# Patient Record
Sex: MALE | Race: WHITE | NOT HISPANIC OR LATINO | Employment: UNEMPLOYED | URBAN - NONMETROPOLITAN AREA
[De-identification: names, ages, dates, MRNs, and addresses within clinical notes are randomized per-mention and may not be internally consistent; named-entity substitution may affect disease eponyms.]

---

## 2017-01-14 DIAGNOSIS — R29.898 WEAKNESS OF HAND: ICD-10-CM

## 2017-01-14 DIAGNOSIS — M50.223 HERNIATION OF INTERVERTEBRAL DISC AT C6-C7 LEVEL: ICD-10-CM

## 2017-01-16 RX ORDER — GABAPENTIN 800 MG/1
TABLET ORAL
Qty: 90 TABLET | Refills: 2 | Status: SHIPPED | OUTPATIENT
Start: 2017-01-16 | End: 2017-03-14 | Stop reason: SDUPTHER

## 2017-03-14 ENCOUNTER — OFFICE VISIT (OUTPATIENT)
Dept: INTERNAL MEDICINE | Facility: CLINIC | Age: 52
End: 2017-03-14

## 2017-03-14 VITALS
HEART RATE: 99 BPM | RESPIRATION RATE: 12 BRPM | BODY MASS INDEX: 31.61 KG/M2 | WEIGHT: 233.4 LBS | OXYGEN SATURATION: 95 % | SYSTOLIC BLOOD PRESSURE: 114 MMHG | HEIGHT: 72 IN | DIASTOLIC BLOOD PRESSURE: 80 MMHG

## 2017-03-14 DIAGNOSIS — M50.223 HERNIATION OF INTERVERTEBRAL DISC AT C6-C7 LEVEL: ICD-10-CM

## 2017-03-14 DIAGNOSIS — F51.04 PSYCHOPHYSIOLOGICAL INSOMNIA: ICD-10-CM

## 2017-03-14 DIAGNOSIS — R29.898 WEAKNESS OF HAND: ICD-10-CM

## 2017-03-14 DIAGNOSIS — F11.93 OPIATE WITHDRAWAL (HCC): Primary | ICD-10-CM

## 2017-03-14 PROCEDURE — 99214 OFFICE O/P EST MOD 30 MIN: CPT | Performed by: FAMILY MEDICINE

## 2017-03-14 RX ORDER — QUETIAPINE FUMARATE 100 MG/1
TABLET, FILM COATED ORAL
Qty: 60 TABLET | Refills: 2 | Status: SHIPPED | OUTPATIENT
Start: 2017-03-14 | End: 2017-04-18 | Stop reason: SDUPTHER

## 2017-03-14 RX ORDER — GABAPENTIN 800 MG/1
800 TABLET ORAL 3 TIMES DAILY
Qty: 90 TABLET | Refills: 2 | Status: SHIPPED | OUTPATIENT
Start: 2017-03-14 | End: 2017-06-10 | Stop reason: SDUPTHER

## 2017-03-14 RX ORDER — TRAZODONE HYDROCHLORIDE 100 MG/1
100 TABLET ORAL NIGHTLY
Qty: 30 TABLET | Refills: 3 | Status: CANCELLED | OUTPATIENT
Start: 2017-03-14

## 2017-03-14 RX ORDER — DICYCLOMINE HCL 20 MG
20 TABLET ORAL EVERY 6 HOURS PRN
Qty: 90 TABLET | Refills: 0 | Status: SHIPPED | OUTPATIENT
Start: 2017-03-14 | End: 2017-04-18

## 2017-03-14 RX ORDER — CLONIDINE HYDROCHLORIDE 0.2 MG/1
0.2 TABLET ORAL EVERY 6 HOURS PRN
Qty: 90 TABLET | Refills: 0 | Status: SHIPPED | OUTPATIENT
Start: 2017-03-14 | End: 2017-04-18

## 2017-03-14 RX ORDER — HYDROXYZINE PAMOATE 25 MG/1
25 CAPSULE ORAL 3 TIMES DAILY PRN
Qty: 90 CAPSULE | Refills: 0 | Status: SHIPPED | OUTPATIENT
Start: 2017-03-14 | End: 2017-04-10 | Stop reason: SDUPTHER

## 2017-03-14 NOTE — PROGRESS NOTES
Follow up visit, med refill on Seroquel. He is doing well on this.  He would also like to have Trazodone 100 mg. He hasn't had this for quite a while. Wondering if it would interfere with the Seroquel.   Discuss Hep C. He would like to see GI  Has not had labs done yet. Will get these done tomorrow.       SUBJECTIVE: Fercho Medellin is a 51 y.o. male seen for a follow up visit;        Insomnia: sleeping better w/ the seroquel - takes just 1/2 tab, occasionally 1 tab.  Feels grumpy and irritable in the morning, but improves.  He ran out.  He switched back to trazodone because he was out - doesn't think that works quite as well.      Drug abuse: He got kicked out of the suboxone clinic because he had a problem w/ the .  He ran out of suboxone on Friday.  Having a lot of diarrhea, bowel cramps, jittery, irritable.        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  He has Insomnia; Mild cognitive disorder; Triceps reflex reduced; Weakness of hand; Focal muscle atrophy; and Low HDL (under 40) on his pertinent problem list.  He  has a past surgical history that includes Back surgery; Appendectomy; Eye surgery; and Interventional radiology procedure (Bilateral, 8/22/2016).  His family history includes Arthritis in his mother, other, and sister; COPD in his father; Diabetes in his father and maternal grandmother; Hyperlipidemia in his other; Hypertension in his father, other, and sister; No Known Problems in his maternal grandfather, paternal grandfather, paternal grandmother, sister, sister, and sister.  He  reports that he has been  "smoking Cigarettes.  He started smoking about 36 years ago. He has a 30.00 pack-year smoking history. He has never used smokeless tobacco. He reports that he drinks alcohol. He reports that he uses illicit drugs, including Marijuana..    Review of Systems   Constitutional: Positive for chills and fatigue.   Gastrointestinal: Positive for diarrhea.   Musculoskeletal: Positive for myalgias.         OBJECTIVE:  Visit Vitals   • /80   • Pulse 99   • Resp 12   • Ht 72\" (182.9 cm)   • Wt 233 lb 6.4 oz (106 kg)   • SpO2 95%   • BMI 31.65 kg/m2        Physical Exam   Constitutional: He appears well-developed and well-nourished.   Cardiovascular: Normal rate and regular rhythm.    Pulmonary/Chest: Effort normal and breath sounds normal.           ASSESSMENT:  1. Weakness of hand  stable  - gabapentin (NEURONTIN) 800 MG tablet; Take 1 tablet by mouth 3 (Three) Times a Day.  Dispense: 90 tablet; Refill: 2    2. Herniation of intervertebral disc at C6-C7 level  Stable, needs EMG  - gabapentin (NEURONTIN) 800 MG tablet; Take 1 tablet by mouth 3 (Three) Times a Day.  Dispense: 90 tablet; Refill: 2    3. Psychophysiological insomnia  improving  - QUEtiapine (SEROquel) 100 MG tablet; Take 0.5-1 tab as needed for sleep  Dispense: 60 tablet; Refill: 2    4. Opiate withdrawal  worsening  - dicyclomine (BENTYL) 20 MG tablet; Take 1 tablet by mouth Every 6 (Six) Hours As Needed (abdominal spasms).  Dispense: 90 tablet; Refill: 0  - CloNIDine (CATAPRES) 0.2 MG tablet; Take 1 tablet by mouth Every 6 (Six) Hours As Needed (agitation, jittery, spasms).  Dispense: 90 tablet; Refill: 0  - hydrOXYzine (VISTARIL) 25 MG capsule; Take 1 capsule by mouth 3 (Three) Times a Day As Needed for Anxiety (agitation).  Dispense: 90 capsule; Refill: 0                  "

## 2017-03-15 ENCOUNTER — LAB (OUTPATIENT)
Dept: INTERNAL MEDICINE | Facility: CLINIC | Age: 52
End: 2017-03-15

## 2017-03-15 DIAGNOSIS — R39.11 URINARY HESITANCY: ICD-10-CM

## 2017-03-15 DIAGNOSIS — B19.20 HEPATITIS C VIRUS INFECTION, UNSPECIFIED CHRONICITY: ICD-10-CM

## 2017-03-15 DIAGNOSIS — R39.15 URINARY URGENCY: ICD-10-CM

## 2017-03-15 DIAGNOSIS — E78.6 LOW HDL (UNDER 40): ICD-10-CM

## 2017-03-15 DIAGNOSIS — R73.9 HYPERGLYCEMIA: ICD-10-CM

## 2017-03-15 DIAGNOSIS — R73.03 PREDIABETES: ICD-10-CM

## 2017-03-15 PROCEDURE — 36415 COLL VENOUS BLD VENIPUNCTURE: CPT | Performed by: FAMILY MEDICINE

## 2017-03-16 LAB
ALBUMIN SERPL-MCNC: 4.3 G/DL (ref 3.2–4.8)
ALBUMIN/GLOB SERPL: 1.1 G/DL (ref 1.5–2.5)
ALP SERPL-CCNC: 94 U/L (ref 25–100)
ALT SERPL-CCNC: 34 U/L (ref 7–40)
AST SERPL-CCNC: 23 U/L (ref 0–33)
BILIRUB SERPL-MCNC: 0.7 MG/DL (ref 0.3–1.2)
BUN SERPL-MCNC: 13 MG/DL (ref 9–23)
BUN/CREAT SERPL: 14.4 (ref 7–25)
CALCIUM SERPL-MCNC: 10.2 MG/DL (ref 8.7–10.4)
CHLORIDE SERPL-SCNC: 103 MMOL/L (ref 99–109)
CHOLEST SERPL-MCNC: 188 MG/DL (ref 0–200)
CO2 SERPL-SCNC: 35 MMOL/L (ref 20–31)
CREAT SERPL-MCNC: 0.9 MG/DL (ref 0.6–1.3)
GLOBULIN SER CALC-MCNC: 3.8 GM/DL
GLUCOSE SERPL-MCNC: 111 MG/DL (ref 70–100)
HCV RNA SERPL NAA+PROBE-ACNC: 680 IU/ML
HCV RNA SERPL NAA+PROBE-LOG IU: 2.83 LOG10 IU/ML
HDLC SERPL-MCNC: 37 MG/DL (ref 40–60)
LDLC SERPL CALC-MCNC: 127 MG/DL (ref 0–100)
POTASSIUM SERPL-SCNC: 4.9 MMOL/L (ref 3.5–5.5)
PROT SERPL-MCNC: 8.1 G/DL (ref 5.7–8.2)
PSA SERPL-MCNC: 1.2 NG/ML (ref 0–4)
SODIUM SERPL-SCNC: 141 MMOL/L (ref 132–146)
TEST INFORMATION: NORMAL
TRIGL SERPL-MCNC: 121 MG/DL (ref 0–150)
VLDLC SERPL CALC-MCNC: 24.2 MG/DL

## 2017-04-10 DIAGNOSIS — F11.93 OPIATE WITHDRAWAL (HCC): ICD-10-CM

## 2017-04-11 RX ORDER — HYDROXYZINE PAMOATE 25 MG/1
CAPSULE ORAL
Qty: 90 CAPSULE | Refills: 2 | Status: SHIPPED | OUTPATIENT
Start: 2017-04-11 | End: 2017-04-18

## 2017-04-18 ENCOUNTER — OFFICE VISIT (OUTPATIENT)
Dept: INTERNAL MEDICINE | Facility: CLINIC | Age: 52
End: 2017-04-18

## 2017-04-18 VITALS
WEIGHT: 231 LBS | SYSTOLIC BLOOD PRESSURE: 122 MMHG | HEIGHT: 72 IN | DIASTOLIC BLOOD PRESSURE: 80 MMHG | BODY MASS INDEX: 31.29 KG/M2 | HEART RATE: 92 BPM | RESPIRATION RATE: 12 BRPM

## 2017-04-18 DIAGNOSIS — F51.04 PSYCHOPHYSIOLOGICAL INSOMNIA: ICD-10-CM

## 2017-04-18 DIAGNOSIS — R10.9 RIGHT FLANK PAIN: ICD-10-CM

## 2017-04-18 DIAGNOSIS — R73.03 PREDIABETES: Primary | ICD-10-CM

## 2017-04-18 DIAGNOSIS — Z72.0 TOBACCO USE: ICD-10-CM

## 2017-04-18 DIAGNOSIS — R35.1 NOCTURIA: ICD-10-CM

## 2017-04-18 LAB — HBA1C MFR BLD: 5.8 %

## 2017-04-18 PROCEDURE — 83036 HEMOGLOBIN GLYCOSYLATED A1C: CPT | Performed by: FAMILY MEDICINE

## 2017-04-18 PROCEDURE — 81003 URINALYSIS AUTO W/O SCOPE: CPT | Performed by: FAMILY MEDICINE

## 2017-04-18 PROCEDURE — 99213 OFFICE O/P EST LOW 20 MIN: CPT | Performed by: FAMILY MEDICINE

## 2017-04-18 RX ORDER — NICOTINE 21 MG/24HR
1 PATCH, TRANSDERMAL 24 HOURS TRANSDERMAL EVERY 24 HOURS
Qty: 21 PATCH | Refills: 0 | Status: SHIPPED | OUTPATIENT
Start: 2017-04-18 | End: 2017-04-18

## 2017-04-18 RX ORDER — TIZANIDINE 4 MG/1
4 TABLET ORAL NIGHTLY PRN
Qty: 30 TABLET | Refills: 1 | Status: SHIPPED | OUTPATIENT
Start: 2017-04-18 | End: 2017-08-09 | Stop reason: SDUPTHER

## 2017-04-18 RX ORDER — QUETIAPINE FUMARATE 25 MG/1
TABLET, FILM COATED ORAL
Qty: 60 TABLET | Refills: 1 | Status: SHIPPED | OUTPATIENT
Start: 2017-04-18 | End: 2017-08-12 | Stop reason: SDUPTHER

## 2017-04-18 RX ORDER — POLYETHYLENE GLYCOL 3350 17 G
4 POWDER IN PACKET (EA) ORAL AS NEEDED
Qty: 168 LOZENGE | Refills: 3 | Status: SHIPPED | OUTPATIENT
Start: 2017-04-18 | End: 2018-07-13

## 2017-04-18 NOTE — PATIENT INSTRUCTIONS
Steps to Quit Smoking   Smoking tobacco can be harmful to your health and can affect almost every organ in your body. Smoking puts you, and those around you, at risk for developing many serious chronic diseases. Quitting smoking is difficult, but it is one of the best things that you can do for your health. It is never too late to quit.  WHAT ARE THE BENEFITS OF QUITTING SMOKING?  When you quit smoking, you lower your risk of developing serious diseases and conditions, such as:  · Lung cancer or lung disease, such as COPD.  · Heart disease.  · Stroke.  · Heart attack.  · Infertility.  · Osteoporosis and bone fractures.  Additionally, symptoms such as coughing, wheezing, and shortness of breath may get better when you quit. You may also find that you get sick less often because your body is stronger at fighting off colds and infections. If you are pregnant, quitting smoking can help to reduce your chances of having a baby of low birth weight.  HOW DO I GET READY TO QUIT?  When you decide to quit smoking, create a plan to make sure that you are successful. Before you quit:  · Pick a date to quit. Set a date within the next two weeks to give you time to prepare.  · Write down the reasons why you are quitting. Keep this list in places where you will see it often, such as on your bathroom mirror or in your car or wallet.  · Identify the people, places, things, and activities that make you want to smoke (triggers) and avoid them. Make sure to take these actions:    Throw away all cigarettes at home, at work, and in your car.    Throw away smoking accessories, such as ashtrays and lighters.    Clean your car and make sure to empty the ashtray.    Clean your home, including curtains and carpets.  · Tell your family, friends, and coworkers that you are quitting. Support from your loved ones can make quitting easier.  · Talk with your health care provider about your options for quitting smoking.  · Find out what treatment  "options are covered by your health insurance.  WHAT STRATEGIES CAN I USE TO QUIT SMOKING?   Talk with your healthcare provider about different strategies to quit smoking. Some strategies include:  · Quitting smoking altogether instead of gradually lessening how much you smoke over a period of time. Research shows that quitting \"cold turkey\" is more successful than gradually quitting.  · Attending in-person counseling to help you build problem-solving skills. You are more likely to have success in quitting if you attend several counseling sessions. Even short sessions of 10 minutes can be effective.  · Finding resources and support systems that can help you to quit smoking and remain smoke-free after you quit. These resources are most helpful when you use them often. They can include:    Online chats with a counselor.    Telephone quitlines.    Printed self-help materials.    Support groups or group counseling.    Text messaging programs.    Mobile phone applications.  · Taking medicines to help you quit smoking. (If you are pregnant or breastfeeding, talk with your health care provider first.) Some medicines contain nicotine and some do not. Both types of medicines help with cravings, but the medicines that include nicotine help to relieve withdrawal symptoms. Your health care provider may recommend:    Nicotine patches, gum, or lozenges.    Nicotine inhalers or sprays.    Non-nicotine medicine that is taken by mouth.  Talk with your health care provider about combining strategies, such as taking medicines while you are also receiving in-person counseling. Using these two strategies together makes you more likely to succeed in quitting than if you used either strategy on its own.  If you are pregnant or breastfeeding, talk with your health care provider about finding counseling or other support strategies to quit smoking. Do not take medicine to help you quit smoking unless told to do so by your health care " provider.  WHAT THINGS CAN I DO TO MAKE IT EASIER TO QUIT?  Quitting smoking might feel overwhelming at first, but there is a lot that you can do to make it easier. Take these important actions:  · Reach out to your family and friends and ask that they support and encourage you during this time. Call telephone quitlines, reach out to support groups, or work with a counselor for support.  · Ask people who smoke to avoid smoking around you.  · Avoid places that trigger you to smoke, such as bars, parties, or smoke-break areas at work.  · Spend time around people who do not smoke.  · Lessen stress in your life, because stress can be a smoking trigger for some people. To lessen stress, try:    Exercising regularly.    Deep-breathing exercises.    Yoga.    Meditating.    Performing a body scan. This involves closing your eyes, scanning your body from head to toe, and noticing which parts of your body are particularly tense. Purposefully relax the muscles in those areas.  · Download or purchase mobile phone or tablet apps (applications) that can help you stick to your quit plan by providing reminders, tips, and encouragement. There are many free apps, such as QuitGuide from the CDC (Centers for Disease Control and Prevention). You can find other support for quitting smoking (smoking cessation) through smokefree.gov and other websites.  HOW WILL I FEEL WHEN I QUIT SMOKING?  Within the first 24 hours of quitting smoking, you may start to feel some withdrawal symptoms. These symptoms are usually most noticeable 2-3 days after quitting, but they usually do not last beyond 2-3 weeks. Changes or symptoms that you might experience include:  · Mood swings.  · Restlessness, anxiety, or irritation.  · Difficulty concentrating.  · Dizziness.  · Strong cravings for sugary foods in addition to nicotine.  · Mild weight gain.  · Constipation.  · Nausea.  · Coughing or a sore throat.  · Changes in how your medicines work in your  body.  · A depressed mood.  · Difficulty sleeping (insomnia).  After the first 2-3 weeks of quitting, you may start to notice more positive results, such as:  · Improved sense of smell and taste.  · Decreased coughing and sore throat.  · Slower heart rate.  · Lower blood pressure.  · Clearer skin.  · The ability to breathe more easily.  · Fewer sick days.  Quitting smoking is very challenging for most people. Do not get discouraged if you are not successful the first time. Some people need to make many attempts to quit before they achieve long-term success. Do your best to stick to your quit plan, and talk with your health care provider if you have any questions or concerns.     This information is not intended to replace advice given to you by your health care provider. Make sure you discuss any questions you have with your health care provider.     Document Released: 12/12/2002 Document Revised: 05/03/2016 Document Reviewed: 05/03/2016  [employer] Interactive Patient Education ©2016 [employer]

## 2017-04-18 NOTE — PROGRESS NOTES
Follow up visit. C/O right lower back ache x 10 days. No known injury.   C/O depression with Seroquel. Feels groggy until ~ 2 pm every day, takes Seroquel at bedtime.     SUBJECTIVE: Fercho Medellin is a 51 y.o. male seen for a follow up visit;    Right lower back pain: started 10 days ago, almost constant.  No urinary complaints except continuing nocturia and some hesitancy.  No hematuria.  No back injury that he can think of.  Pain doesn't really change depending on position.  No BM changes.      Insomnia: thinks the seroquel is making him groggy, irritable, on 100 mg seroquel.  Denies taking trazodone with it.       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  He has Insomnia; Mild cognitive disorder; Triceps reflex reduced; Weakness of hand; Focal muscle atrophy; and Low HDL (under 40) on his pertinent problem list.  He  has a past surgical history that includes Back surgery; Appendectomy; Eye surgery; and Interventional radiology procedure (Bilateral, 8/22/2016).  His family history includes Arthritis in his mother, other, and sister; COPD in his father; Diabetes in his father and maternal grandmother; Hyperlipidemia in his other; Hypertension in his father, other, and sister; No Known Problems in his maternal grandfather, paternal grandfather, paternal grandmother, sister, sister, and sister.  He  reports that he has been smoking Cigarettes.  He started smoking about 36 years ago. He has a 30.00 pack-year smoking history. He has never used smokeless tobacco. He reports that he drinks alcohol. He reports that he uses illicit drugs,  "including Marijuana..    Review of Systems   Constitutional: Negative for chills and fever.   Respiratory: Negative.    Cardiovascular: Negative.    Genitourinary: Positive for difficulty urinating and flank pain. Negative for decreased urine volume, dysuria, frequency, hematuria and urgency.   Musculoskeletal: Positive for back pain. Negative for myalgias.   Psychiatric/Behavioral: Positive for dysphoric mood and sleep disturbance.         OBJECTIVE:  /80  Pulse 92  Resp 12  Ht 72\" (182.9 cm)  Wt 231 lb (105 kg)  BMI 31.33 kg/m2     Physical Exam   Constitutional: He appears well-developed and well-nourished. No distress.   Abdominal: There is no tenderness. There is CVA tenderness.   Genitourinary: Rectum normal. Prostate is enlarged. Prostate is not tender.   Genitourinary Comments: 40 gm prostate     Office Visit on 04/18/2017   Component Date Value Ref Range Status   • Hemoglobin A1C 04/18/2017 5.8  % Final   • Color 04/18/2017 Yellow  Yellow, Straw, Dark Yellow, [place] Final   • Clarity, UA 04/18/2017 Clear  Clear Final   • Glucose, UA 04/18/2017 Negative  Negative, 1000 mg/dL (3+) mg/dL Final   • Bilirubin 04/18/2017 Negative  Negative Final   • Ketones, UA 04/18/2017 Negative  Negative Final   • Specific Gravity  04/18/2017 1.015  1.005 - 1.030 Final   • Blood, UA 04/18/2017 Negative  Negative Final   • pH, Urine 04/18/2017 6.0  5.0 - 8.0 Final   • Protein, POC 04/18/2017 Negative  Negative mg/dL Final   • Urobilinogen, UA 04/18/2017 Normal  Normal Final   • Leukocytes 04/18/2017 Negative  Negative Final   • Nitrite, UA 04/18/2017 Negative  Negative Final   ]      ASSESSMENT:  1. Prediabetes  stable  - POC Glycosylated Hemoglobin (Hb A1C)    2. Psychophysiological insomnia  Worsening, will decrease seroquel to better tolerated dose  - QUEtiapine (SEROquel) 25 MG tablet; Take 1-2 tabs as needed for sleep  Dispense: 60 tablet; Refill: 1    3. Right flank pain  Kidney stone versus kidney infection " versus musculoskeletal back pain  - POC Urinalysis Dipstick, Automated; Future  - Chlamydia trachomatis, Neisseria gonorrhoeae, PCR w/ confirmation  - tiZANidine (ZANAFLEX) 4 MG tablet; Take 1 tablet by mouth At Night As Needed for Muscle Spasms.  Dispense: 30 tablet; Refill: 1  - Urine Culture  - POC Urinalysis Dipstick, Automated    4. Tobacco use    - nicotine polacrilex (COMMIT) 4 MG lozenge; Dissolve 1 lozenge in the mouth As Needed for Smoking Cessation.  Dispense: 168 lozenge; Refill: 3    5. Nocturia  stable        I, Luna Crystal MD, hereby attest that the medical record entry above accurately reflects signatures/notations that I made in my capacity as Luna Crystal MD when I treated and diagnosed the above patient.  I do hereby attest that his information is true, accurate, and complete to the best of my knowledge and I understand that any falsification, omission, or concealment of material fact may subject me to administrative, civil, or criminal liability.

## 2017-04-19 LAB
BILIRUB BLD-MCNC: NEGATIVE MG/DL
CLARITY, POC: CLEAR
COLOR UR: YELLOW
GLUCOSE UR STRIP-MCNC: NEGATIVE MG/DL
KETONES UR QL: NEGATIVE
LEUKOCYTE EST, POC: NEGATIVE
NITRITE UR-MCNC: NEGATIVE MG/ML
PH UR: 6 [PH] (ref 5–8)
PROT UR STRIP-MCNC: NEGATIVE MG/DL
RBC # UR STRIP: NEGATIVE /UL
SP GR UR: 1.01 (ref 1–1.03)
UROBILINOGEN UR QL: NORMAL

## 2017-04-25 DIAGNOSIS — F11.93 OPIATE WITHDRAWAL (HCC): ICD-10-CM

## 2017-04-25 RX ORDER — DICYCLOMINE HCL 20 MG
TABLET ORAL
Qty: 90 TABLET | Refills: 2 | Status: SHIPPED | OUTPATIENT
Start: 2017-04-25 | End: 2017-08-24 | Stop reason: SDUPTHER

## 2017-04-25 RX ORDER — CLONIDINE HYDROCHLORIDE 0.2 MG/1
TABLET ORAL
Qty: 90 TABLET | Refills: 2 | Status: SHIPPED | OUTPATIENT
Start: 2017-04-25 | End: 2017-08-24 | Stop reason: SDUPTHER

## 2017-06-10 DIAGNOSIS — M50.223 HERNIATION OF INTERVERTEBRAL DISC AT C6-C7 LEVEL: ICD-10-CM

## 2017-06-10 DIAGNOSIS — R29.898 WEAKNESS OF HAND: ICD-10-CM

## 2017-06-12 RX ORDER — GABAPENTIN 800 MG/1
TABLET ORAL
Qty: 90 TABLET | Refills: 0 | Status: SHIPPED | OUTPATIENT
Start: 2017-06-12 | End: 2017-07-11 | Stop reason: SDUPTHER

## 2017-07-11 DIAGNOSIS — M50.223 HERNIATION OF INTERVERTEBRAL DISC AT C6-C7 LEVEL: ICD-10-CM

## 2017-07-11 DIAGNOSIS — R29.898 WEAKNESS OF HAND: ICD-10-CM

## 2017-07-11 RX ORDER — GABAPENTIN 800 MG/1
800 TABLET ORAL 3 TIMES DAILY
Qty: 90 TABLET | Refills: 0 | OUTPATIENT
Start: 2017-07-11 | End: 2017-08-09 | Stop reason: SDUPTHER

## 2017-07-26 ENCOUNTER — TELEPHONE (OUTPATIENT)
Dept: INTERNAL MEDICINE | Facility: CLINIC | Age: 52
End: 2017-07-26

## 2017-07-26 NOTE — TELEPHONE ENCOUNTER
Patient calling to schedule an appt. He said he got a letter saying he No Showed 3 times and he wanted to reschedule. I asked him if the letter said he was going to be discharged and he said he didn't know.    He said he had memory loss and that was part of his problem.    I wanted to send a message back to see if Dr. Crystal was planning on discharging him or if I should go ahead and schedule him.     I can give him a call back after Dr. Crystal decides.

## 2017-07-27 NOTE — TELEPHONE ENCOUNTER
"Quote from letter sent from epic system:   \"We are sorry that you missed your appointment with Dr. Crystal on 7/18/2017. This is your 3rd no show appointment since October. Our office policy states after three no shows you can be discharged from the practice. Your health and follow-up medical care are important to us. Please call our office as soon as possible so that we may reschedule your appointment. If you have already rescheduled your appointment, please disregard this letter.\"    The letter was a warning that in most situations we would have to discharge him, if it happens again I will have to.  He can reschedule his appointment.   "

## 2017-07-29 DIAGNOSIS — F11.93 OPIATE WITHDRAWAL (HCC): ICD-10-CM

## 2017-08-03 RX ORDER — CLONIDINE HYDROCHLORIDE 0.2 MG/1
TABLET ORAL
Qty: 90 TABLET | Refills: 2 | OUTPATIENT
Start: 2017-08-03

## 2017-08-03 NOTE — TELEPHONE ENCOUNTER
Called [facility] pharmacy to see if pt requested or if it was automatic refill sent to us. Peter at [facility] couldn't tell how it came through on their system, he put in denial and d/c for med, will send pt text letting him know and to follow up with us.

## 2017-08-09 ENCOUNTER — TELEPHONE (OUTPATIENT)
Dept: INTERNAL MEDICINE | Facility: CLINIC | Age: 52
End: 2017-08-09

## 2017-08-09 DIAGNOSIS — R10.9 RIGHT FLANK PAIN: ICD-10-CM

## 2017-08-09 DIAGNOSIS — M50.223 HERNIATION OF INTERVERTEBRAL DISC AT C6-C7 LEVEL: ICD-10-CM

## 2017-08-09 DIAGNOSIS — R29.898 WEAKNESS OF HAND: ICD-10-CM

## 2017-08-09 RX ORDER — TIZANIDINE 4 MG/1
4 TABLET ORAL NIGHTLY PRN
Qty: 30 TABLET | Refills: 1 | Status: SHIPPED | OUTPATIENT
Start: 2017-08-09 | End: 2017-09-18 | Stop reason: SDUPTHER

## 2017-08-10 RX ORDER — GABAPENTIN 800 MG/1
800 TABLET ORAL 3 TIMES DAILY
Qty: 21 TABLET | Refills: 0 | OUTPATIENT
Start: 2017-08-10 | End: 2017-08-10 | Stop reason: SDUPTHER

## 2017-08-10 RX ORDER — GABAPENTIN 800 MG/1
800 TABLET ORAL 3 TIMES DAILY
Qty: 21 TABLET | Refills: 0 | OUTPATIENT
Start: 2017-08-10 | End: 2017-08-18

## 2017-08-12 DIAGNOSIS — F51.04 PSYCHOPHYSIOLOGICAL INSOMNIA: ICD-10-CM

## 2017-08-14 RX ORDER — QUETIAPINE FUMARATE 25 MG/1
TABLET, FILM COATED ORAL
Qty: 60 TABLET | Refills: 1 | Status: SHIPPED | OUTPATIENT
Start: 2017-08-14 | End: 2018-07-13

## 2017-08-17 ENCOUNTER — TELEPHONE (OUTPATIENT)
Dept: INTERNAL MEDICINE | Facility: CLINIC | Age: 52
End: 2017-08-17

## 2017-08-17 DIAGNOSIS — M50.223 HERNIATION OF INTERVERTEBRAL DISC AT C6-C7 LEVEL: ICD-10-CM

## 2017-08-17 DIAGNOSIS — R29.898 WEAKNESS OF HAND: ICD-10-CM

## 2017-08-17 NOTE — TELEPHONE ENCOUNTER
Patient's wife called stating patient left appt today after waiting for an hour and 30 minutes. States that his stress and anxiety was getting to him while waiting. He needs to reschedule, but also needs his gabapentin refilled. Can this be called in for him?

## 2017-08-18 RX ORDER — GABAPENTIN 800 MG/1
TABLET ORAL
Qty: 90 TABLET | Refills: 0 | OUTPATIENT
Start: 2017-08-18 | End: 2017-08-18 | Stop reason: SDUPTHER

## 2017-08-18 RX ORDER — GABAPENTIN 800 MG/1
800 TABLET ORAL 3 TIMES DAILY
Qty: 90 TABLET | Refills: 1 | OUTPATIENT
Start: 2017-08-18 | End: 2018-07-13

## 2017-08-24 DIAGNOSIS — F11.93 OPIATE WITHDRAWAL (HCC): ICD-10-CM

## 2017-08-24 RX ORDER — CLONIDINE HYDROCHLORIDE 0.2 MG/1
0.2 TABLET ORAL
Qty: 90 TABLET | Refills: 2 | Status: SHIPPED | OUTPATIENT
Start: 2017-08-24 | End: 2017-09-19

## 2017-08-24 RX ORDER — DICYCLOMINE HCL 20 MG
20 TABLET ORAL EVERY 6 HOURS
Qty: 90 TABLET | Refills: 1 | Status: SHIPPED | OUTPATIENT
Start: 2017-08-24 | End: 2017-09-19

## 2017-09-18 ENCOUNTER — TELEPHONE (OUTPATIENT)
Dept: INTERNAL MEDICINE | Facility: CLINIC | Age: 52
End: 2017-09-18

## 2017-09-18 DIAGNOSIS — R10.9 RIGHT FLANK PAIN: ICD-10-CM

## 2017-09-18 DIAGNOSIS — R29.898 WEAKNESS OF HAND: ICD-10-CM

## 2017-09-18 DIAGNOSIS — M50.223 HERNIATION OF INTERVERTEBRAL DISC AT C6-C7 LEVEL: ICD-10-CM

## 2017-09-19 RX ORDER — GABAPENTIN 800 MG/1
TABLET ORAL
Qty: 90 TABLET | OUTPATIENT
Start: 2017-09-19

## 2017-09-19 RX ORDER — TIZANIDINE 4 MG/1
TABLET ORAL
Qty: 30 TABLET | Refills: 1 | Status: SHIPPED | OUTPATIENT
Start: 2017-09-19 | End: 2018-07-13

## 2017-09-19 NOTE — TELEPHONE ENCOUNTER
Pt is not supposed to be on clonidine or bentyl.  Those medications were one-time prescriptions for opiate withdrawal - they were refilled automatically on 8/24.     On 8/18 his gabapentin was supposed to have been phoned in for 90 days with 1 refill.      tizanadine was refilled today.

## 2017-09-21 NOTE — TELEPHONE ENCOUNTER
Spoke with [facility] With their computer system, it automatically sends a refill out when the last refill was filled. So we got a refill request on Gabapentin when pt got his last refill on it. ADvised pharm we will not refill at this time. Will do refill when it is due. They also cancelled clonidine and Bentyl.

## 2018-05-07 ENCOUNTER — HOSPITAL ENCOUNTER (EMERGENCY)
Facility: HOSPITAL | Age: 53
Discharge: HOME | End: 2018-05-07
Attending: STUDENT IN AN ORGANIZED HEALTH CARE EDUCATION/TRAINING PROGRAM | Admitting: STUDENT IN AN ORGANIZED HEALTH CARE EDUCATION/TRAINING PROGRAM

## 2018-05-07 VITALS
HEIGHT: 72 IN | RESPIRATION RATE: 20 BRPM | BODY MASS INDEX: 31.83 KG/M2 | SYSTOLIC BLOOD PRESSURE: 160 MMHG | TEMPERATURE: 100.5 F | OXYGEN SATURATION: 96 % | HEART RATE: 112 BPM | WEIGHT: 235 LBS | DIASTOLIC BLOOD PRESSURE: 67 MMHG

## 2018-05-07 DIAGNOSIS — M54.41 RIGHT-SIDED LOW BACK PAIN WITH RIGHT-SIDED SCIATICA, UNSPECIFIED CHRONICITY: Primary | ICD-10-CM

## 2018-05-07 PROCEDURE — 99283 EMERGENCY DEPT VISIT LOW MDM: CPT

## 2018-05-07 PROCEDURE — 96372 THER/PROPH/DIAG INJ SC/IM: CPT

## 2018-05-07 PROCEDURE — 25010000002 HALOPERIDOL LACTATE PER 5 MG: Performed by: STUDENT IN AN ORGANIZED HEALTH CARE EDUCATION/TRAINING PROGRAM

## 2018-05-07 PROCEDURE — 25010000002 ORPHENADRINE CITRATE PER 60 MG: Performed by: STUDENT IN AN ORGANIZED HEALTH CARE EDUCATION/TRAINING PROGRAM

## 2018-05-07 RX ORDER — ORPHENADRINE CITRATE 30 MG/ML
60 INJECTION INTRAMUSCULAR; INTRAVENOUS ONCE
Status: COMPLETED | OUTPATIENT
Start: 2018-05-07 | End: 2018-05-07

## 2018-05-07 RX ORDER — HALOPERIDOL 5 MG/ML
2.5 INJECTION INTRAMUSCULAR ONCE
Status: COMPLETED | OUTPATIENT
Start: 2018-05-07 | End: 2018-05-07

## 2018-05-07 RX ADMIN — ORPHENADRINE CITRATE 60 MG: 30 INJECTION INTRAMUSCULAR; INTRAVENOUS at 03:58

## 2018-05-07 RX ADMIN — HALOPERIDOL LACTATE 2.5 MG: 5 INJECTION, SOLUTION INTRAMUSCULAR at 03:59

## 2018-05-07 NOTE — ED PROVIDER NOTES
Subjective   Patient's 52-year-old male presents via EMS stating that he went to [facility] this evening and they didn't do anything for him.  Patient does have a history of chronic back pain as well as a history of narcotic drug abuse.  He states all they would give him was Toradol.  States his primary care physician used to give him Percocet but now she does not believe in pain pills.  States he is trying to get into pain management but has been unsuccessful so far.  Patient states he has severe constant aching pain radiates down his right buttock and into his right leg.            Review of Systems   All other systems reviewed and are negative.      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    • Migraine headache    • Pectoralis muscle rupture    • Tinnitus    • Triceps reflex reduced    • Unsp injury of musc/fasc/tend triceps, right arm, init        Allergies   Allergen Reactions   • Eye Drops [Naphazoline-Polyethyl Glycol]        Past Surgical History:   Procedure Laterality Date   • APPENDECTOMY     • BACK SURGERY      Tail bone   • EYE SURGERY      cornea transplant; insertion of ocular implant   • INTERVENTIONAL RADIOLOGY PROCEDURE Bilateral 8/22/2016    Procedure:  myelogram cervical with fluoroscopy to be preformed by  in the Cath lab - w/ a f/u CT;  Surgeon: Bryan Miller MD;  Location: [facility] INVASIVE LOCATION;  Service:        Family History   Problem Relation Age of Onset   • Arthritis Mother    • COPD Father    • Hypertension Father    • Diabetes Father    • Arthritis Other    • Hyperlipidemia Other    • Hypertension Other    • Arthritis Sister    • Hypertension Sister    • Diabetes Maternal Grandmother    • No Known Problems Maternal Grandfather    • No Known Problems Paternal Grandmother    • No Known  Problems Paternal Grandfather    • No Known Problems Sister    • No Known Problems Sister    • No Known Problems Sister        Social History     Social History   • Marital status:      Social History Main Topics   • Smoking status: Current Every Day Smoker     Packs/day: 1.00     Years: 30.00     Types: Cigarettes     Start date: 1981   • Smokeless tobacco: Never Used   • Alcohol use Yes      Comment: rare, maybe 1 every few months   • Drug use:      Types: Marijuana   • Sexual activity: Yes     Partners: Female     Other Topics Concern   • Not on file           Objective   Physical Exam   Nursing note and vitals reviewed.    GEN:  is standing up with his right hand on his lower back rubbing his lower back and buttocks.  Head: Normocephalic, atraumatic  Eyes: Pupils equal round reactive to light  ENT: Posterior pharynx normal in appearance, oral mucosa is moist  Chest: Nontender to palpation  Cardiovascular: Regular rate  Lungs: Clear to auscultation bilaterally  Abdomen: Soft, nontender, nondistended, no peritoneal signs  Back: Patient wails uncontrollably with even the slightest touch to his right lower lumbosacral region  Extremities: No edema, normal appearance  Neuro: GCS 15 strength 5 out of 5 in bilateral lower extremities  Psych: Mood and affect are appropriate    Procedures           ED Course  ED Course                  MDM  Number of Diagnoses or Management Options  Right-sided low back pain with right-sided sciatica, unspecified chronicity:   Diagnosis management comments: Differential diagnosis would include musculoskeletal strain, lumbar disc disease, vertebral fracture, cauda equina, or other concerns.    Highly doubt cauda equina at this time given that the patient has no urinary retention, focal neurologic deficit, saddle anesthesia, or loss of control of bowel or bladder.  Did have a discussion about current laws regarding narcotic prescriptions and narcotics use in the emergency  departments in [place].  Did explain to him that we would not be allowed to treat his chronic pain with narcotics in the emergency department.  I did agree to treat him with muscle relaxers as well as something for his nausea because his pain was so severe.  Patient still persisted to request something more despite persistent decline from me regarding this.       Amount and/or Complexity of Data Reviewed  Decide to obtain previous medical records or to obtain history from someone other than the patient: yes  Obtain history from someone other than the patient: yes  Review and summarize past medical records: yes          Final diagnoses:   Right-sided low back pain with right-sided sciatica, unspecified chronicity            Sukumar Daniels MD  05/07/18 0430

## 2018-05-10 ENCOUNTER — HOSPITAL ENCOUNTER (EMERGENCY)
Facility: HOSPITAL | Age: 53
Discharge: HOME | End: 2018-05-10
Attending: EMERGENCY MEDICINE | Admitting: EMERGENCY MEDICINE

## 2018-05-10 VITALS
TEMPERATURE: 99.6 F | RESPIRATION RATE: 20 BRPM | SYSTOLIC BLOOD PRESSURE: 110 MMHG | WEIGHT: 235 LBS | HEART RATE: 100 BPM | BODY MASS INDEX: 31.83 KG/M2 | DIASTOLIC BLOOD PRESSURE: 72 MMHG | HEIGHT: 72 IN | OXYGEN SATURATION: 98 %

## 2018-05-10 DIAGNOSIS — M54.31 SCIATICA OF RIGHT SIDE: ICD-10-CM

## 2018-05-10 DIAGNOSIS — M62.838 MUSCLE SPASM: Primary | ICD-10-CM

## 2018-05-10 PROCEDURE — 25010000002 KETOROLAC TROMETHAMINE PER 15 MG: Performed by: EMERGENCY MEDICINE

## 2018-05-10 PROCEDURE — 96372 THER/PROPH/DIAG INJ SC/IM: CPT

## 2018-05-10 PROCEDURE — 99283 EMERGENCY DEPT VISIT LOW MDM: CPT

## 2018-05-10 PROCEDURE — 25010000002 ORPHENADRINE CITRATE PER 60 MG: Performed by: EMERGENCY MEDICINE

## 2018-05-10 RX ORDER — ORPHENADRINE CITRATE 30 MG/ML
60 INJECTION INTRAMUSCULAR; INTRAVENOUS ONCE
Status: COMPLETED | OUTPATIENT
Start: 2018-05-10 | End: 2018-05-10

## 2018-05-10 RX ORDER — KETOROLAC TROMETHAMINE 30 MG/ML
30 INJECTION, SOLUTION INTRAMUSCULAR; INTRAVENOUS ONCE
Status: COMPLETED | OUTPATIENT
Start: 2018-05-10 | End: 2018-05-10

## 2018-05-10 RX ORDER — CARISOPRODOL 350 MG/1
350 TABLET ORAL 4 TIMES DAILY PRN
Qty: 15 TABLET | Refills: 0 | Status: SHIPPED | OUTPATIENT
Start: 2018-05-10 | End: 2018-07-13

## 2018-05-10 RX ADMIN — ORPHENADRINE CITRATE 60 MG: 30 INJECTION INTRAMUSCULAR; INTRAVENOUS at 11:27

## 2018-05-10 RX ADMIN — KETOROLAC TROMETHAMINE 30 MG: 30 INJECTION, SOLUTION INTRAMUSCULAR; INTRAVENOUS at 11:27

## 2018-05-10 NOTE — ED PROVIDER NOTES
Subjective   52-year-old male presents emergency Department with complaints of lower lumbar pain over the last 4 days.  Patient states that he was seen at [facility] as well as here a few days ago for the same complaint.  Patient states he was told he had a muscle spasm and sciatica.  Patient has percocet that he was prescribed in the past that he has been using for his pain.  It's not helping. He says his PCP will not prescribe narcotic pain medication for his chronic back pain.  He's currently trying to get into pain management. Patient says initially his pain was on the R paralumbar area with radiation into the R buttock. He's having the same pain today. Denies numbness, tingling, weakness, bowel or bladder incontinence, urinary retention, saddle anesthesia. Able to ambulate.           Back Pain   Location:  Lumbar spine  Quality:  Aching  Radiates to: L buttock.  Pain severity:  Moderate  Pain is:  Same all the time  Onset quality:  Gradual  Timing:  Constant  Chronicity:  Recurrent  Context: not falling, not recent illness, not recent injury and not twisting    Relieved by:  Nothing  Worsened by:  Nothing  Ineffective treatments:  Narcotics  Associated symptoms: no abdominal pain, no bladder incontinence, no bowel incontinence, no chest pain, no dysuria, no fever, no leg pain, no numbness, no paresthesias, no pelvic pain, no perianal numbness, no tingling and no weakness        Review of Systems   Constitutional: Negative for chills and fever.   HENT: Negative for congestion, rhinorrhea, sore throat and trouble swallowing.    Eyes: Negative for discharge and visual disturbance.   Respiratory: Negative for cough, chest tightness, shortness of breath and wheezing.    Cardiovascular: Negative for chest pain, palpitations and leg swelling.   Gastrointestinal: Negative for abdominal pain, bowel incontinence, constipation, diarrhea, nausea and vomiting.   Genitourinary: Negative for bladder incontinence,  dysuria, flank pain, hematuria and pelvic pain.   Musculoskeletal: Positive for back pain. Negative for myalgias and neck pain.   Skin: Negative for color change and rash.   Neurological: Negative for dizziness, tingling, weakness, numbness and paresthesias.   Psychiatric/Behavioral: Negative for self-injury and suicidal ideas.       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    • Migraine headache    • Pectoralis muscle rupture    • Tinnitus    • Triceps reflex reduced    • Unsp injury of musc/fasc/tend triceps, right arm, init        Allergies   Allergen Reactions   • Eye Drops [Naphazoline-Polyethyl Glycol] Rash       Past Surgical History:   Procedure Laterality Date   • APPENDECTOMY     • BACK SURGERY      Tail bone   • EYE SURGERY      cornea transplant; insertion of ocular implant   • INTERVENTIONAL RADIOLOGY PROCEDURE Bilateral 8/22/2016    Procedure:  myelogram cervical with fluoroscopy to be preformed by  in the Cath lab - w/ a f/u CT;  Surgeon: Bryan Miller MD;  Location: [facility] INVASIVE LOCATION;  Service:        Family History   Problem Relation Age of Onset   • Arthritis Mother    • COPD Father    • Hypertension Father    • Diabetes Father    • Arthritis Other    • Hyperlipidemia Other    • Hypertension Other    • Arthritis Sister    • Hypertension Sister    • Diabetes Maternal Grandmother    • No Known Problems Maternal Grandfather    • No Known Problems Paternal Grandmother    • No Known Problems Paternal Grandfather    • No Known Problems Sister    • No Known Problems Sister    • No Known Problems Sister        Social History     Social History   • Marital status:      Social History Main Topics   • Smoking status: Current Every Day Smoker     Packs/day: 1.00     Years: 30.00     Types: Cigarettes      Start date: 1981   • Smokeless tobacco: Never Used   • Alcohol use Yes      Comment: rare, maybe 1 every few months   • Drug use:      Types: Marijuana   • Sexual activity: Yes     Partners: Female     Other Topics Concern   • Not on file           Objective   Physical Exam   Constitutional: He is oriented to person, place, and time. He appears well-developed and well-nourished.   HENT:   Head: Normocephalic and atraumatic.   Nose: Nose normal.   Mouth/Throat: Oropharynx is clear and moist.   Eyes: Conjunctivae and EOM are normal. Pupils are equal, round, and reactive to light.   Neck: Normal range of motion. Neck supple.   Cardiovascular: Normal rate, regular rhythm, normal heart sounds and intact distal pulses.    Pulmonary/Chest: Effort normal and breath sounds normal. No respiratory distress. He has no wheezes. He exhibits no tenderness.   Abdominal: Soft. Bowel sounds are normal. There is no tenderness. There is no rebound and no guarding.   Musculoskeletal: Normal range of motion. He exhibits tenderness. He exhibits no edema or deformity.   R paralumbar tenderness to palpation. No CVA tenderness. No midline lumbar or thoracic tenderness.    Neurological: He is alert and oriented to person, place, and time. No cranial nerve deficit. Coordination normal.   Skin: Skin is warm and dry. No rash noted. No erythema. No pallor.   Psychiatric: He has a normal mood and affect. His behavior is normal. Judgment and thought content normal.   Nursing note and vitals reviewed.      Procedures           ED Course  ED Course                  MDM      Final diagnoses:   Muscle spasm   Sciatica of right side            Rico Gallardo MD  05/10/18 0917

## 2018-05-25 ENCOUNTER — TRANSCRIBE ORDERS (OUTPATIENT)
Dept: ADMINISTRATIVE | Facility: HOSPITAL | Age: 53
End: 2018-05-25

## 2018-05-25 DIAGNOSIS — G89.29 CHRONIC BILATERAL LOW BACK PAIN WITH BILATERAL SCIATICA: Primary | ICD-10-CM

## 2018-05-25 DIAGNOSIS — M54.41 CHRONIC BILATERAL LOW BACK PAIN WITH BILATERAL SCIATICA: Primary | ICD-10-CM

## 2018-05-25 DIAGNOSIS — M54.42 CHRONIC BILATERAL LOW BACK PAIN WITH BILATERAL SCIATICA: Primary | ICD-10-CM

## 2018-06-05 ENCOUNTER — HOSPITAL ENCOUNTER (OUTPATIENT)
Dept: MRI IMAGING | Facility: HOSPITAL | Age: 53
End: 2018-06-05

## 2018-08-21 ENCOUNTER — HOSPITAL ENCOUNTER (EMERGENCY)
Facility: HOSPITAL | Age: 53
Discharge: HOME | End: 2018-08-21
Attending: EMERGENCY MEDICINE | Admitting: EMERGENCY MEDICINE

## 2018-08-21 ENCOUNTER — APPOINTMENT (OUTPATIENT)
Dept: GENERAL RADIOLOGY | Facility: HOSPITAL | Age: 53
End: 2018-08-21
Attending: EMERGENCY MEDICINE

## 2018-08-21 VITALS
SYSTOLIC BLOOD PRESSURE: 108 MMHG | HEIGHT: 72 IN | BODY MASS INDEX: 31.29 KG/M2 | RESPIRATION RATE: 18 BRPM | HEART RATE: 92 BPM | OXYGEN SATURATION: 97 % | TEMPERATURE: 97.9 F | WEIGHT: 231 LBS | DIASTOLIC BLOOD PRESSURE: 73 MMHG

## 2018-08-21 DIAGNOSIS — S62.639A: Primary | ICD-10-CM

## 2018-08-21 PROCEDURE — 73130 X-RAY EXAM OF HAND: CPT

## 2018-08-21 PROCEDURE — 99283 EMERGENCY DEPT VISIT LOW MDM: CPT

## 2018-08-21 NOTE — ED PROVIDER NOTES
Subjective   52-year-old male presents to the ED with chief complaint of right hand injury.  Patient states that the injury initially occurred 3 days ago.  Patient states that he was sleeping and he rolled over and smacked his hand onto another object. He notes that it was not initially swollen. It has since progressed and became more swollen. It has also became more painful.  complains of some redness. Pain worse with movement. No IVDA. No fever or chills. No other complaints.             Review of Systems   Constitutional: Negative for fatigue and fever.   Respiratory: Negative for chest tightness, shortness of breath and wheezing.    Cardiovascular: Negative for chest pain, palpitations and leg swelling.   Gastrointestinal: Negative for abdominal pain, diarrhea, nausea and vomiting.   Musculoskeletal: Positive for arthralgias and joint swelling. Negative for back pain and myalgias.        Right Hand pain.   Skin: Negative for color change and rash.   Neurological: Negative for syncope, weakness and light-headedness.       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 (CMS/HCC)    • Migraine headache    • Pectoralis muscle rupture    • Tinnitus    • Triceps reflex reduced    • Unsp injury of musc/fasc/tend triceps, right arm, init        Allergies   Allergen Reactions   • Eye Drops [Naphazoline-Polyethyl Glycol] Rash       Past Surgical History:   Procedure Laterality Date   • APPENDECTOMY     • BACK SURGERY      Tail bone   • EYE SURGERY      cornea transplant; insertion of ocular implant   • INTERVENTIONAL RADIOLOGY PROCEDURE Bilateral 8/22/2016    Procedure:  myelogram cervical with fluoroscopy to be preformed by  in the Cath lab - w/ a f/u CT;  Surgeon: Bryan Miller MD;  Location: [facility] CATH INVASIVE LOCATION;  Service:         Family History   Problem Relation Age of Onset   • Arthritis Mother    • COPD Father    • Hypertension Father    • Diabetes Father    • Arthritis Other    • Hyperlipidemia Other    • Hypertension Other    • Arthritis Sister    • Hypertension Sister    • Diabetes Maternal Grandmother    • No Known Problems Maternal Grandfather    • No Known Problems Paternal Grandmother    • No Known Problems Paternal Grandfather    • No Known Problems Sister    • No Known Problems Sister    • No Known Problems Sister        Social History     Social History   • Marital status:      Social History Main Topics   • Smoking status: Current Every Day Smoker     Packs/day: 1.00     Years: 30.00     Types: Cigarettes     Start date: 1981   • Smokeless tobacco: Never Used   • Alcohol use Yes      Comment: rare, maybe 1 every few months   • Drug use: Yes     Types: Marijuana   • Sexual activity: Yes     Partners: Female     Other Topics Concern   • Not on file           Objective   Physical Exam   Constitutional: He is oriented to person, place, and time. He appears well-developed and well-nourished. No distress.   HENT:   Head: Normocephalic and atraumatic.   Nose: Nose normal.   Eyes: Pupils are equal, round, and reactive to light. Conjunctivae and EOM are normal.   Neck: No tracheal deviation present.   Cardiovascular: Normal rate and regular rhythm.    No murmur heard.  Pulmonary/Chest: Effort normal and breath sounds normal. No stridor. No respiratory distress.   Abdominal: Soft. Bowel sounds are normal. He exhibits no distension. There is no tenderness.   Musculoskeletal: He exhibits no edema or deformity.   Swelling and tenderness to palpation to the dorsum of the right hand worse on the radial aspect overlying the first and second metacarpals.  Minimal erythema and warmth to the entire dorsum of the right hand.   Neurological: He is alert and oriented to person, place, and time. No cranial nerve deficit. Coordination  normal.   Skin: Skin is warm and dry. He is not diaphoretic.   Nursing note and vitals reviewed.      Procedures           ED Course         right hand pain.  Imaging shows a fracture at the base of the proximal phalanx of the second metacarpal.  I placed the patient in a splint.  We'll discharge with appropriate follow-up he is agreeable with this plan.          MDM      Final diagnoses:   Closed fracture of distal phalanx of digit of hand            Jean-Claude Solano, DO  08/22/18 0956

## 2018-09-07 ENCOUNTER — TRANSCRIBE ORDERS (OUTPATIENT)
Dept: ADMINISTRATIVE | Facility: HOSPITAL | Age: 53
End: 2018-09-07

## 2018-09-07 ENCOUNTER — HOSPITAL ENCOUNTER (EMERGENCY)
Facility: HOSPITAL | Age: 53
Discharge: ANOTHER HOSPITAL | End: 2018-09-07
Attending: STUDENT IN AN ORGANIZED HEALTH CARE EDUCATION/TRAINING PROGRAM | Admitting: STUDENT IN AN ORGANIZED HEALTH CARE EDUCATION/TRAINING PROGRAM

## 2018-09-07 ENCOUNTER — HOSPITAL ENCOUNTER (OUTPATIENT)
Dept: ULTRASOUND IMAGING | Facility: HOSPITAL | Age: 53
Discharge: HOME | End: 2018-09-07
Admitting: ORTHOPAEDIC SURGERY

## 2018-09-07 VITALS
OXYGEN SATURATION: 96 % | DIASTOLIC BLOOD PRESSURE: 79 MMHG | WEIGHT: 225 LBS | TEMPERATURE: 98.1 F | HEIGHT: 72 IN | BODY MASS INDEX: 30.48 KG/M2 | HEART RATE: 110 BPM | SYSTOLIC BLOOD PRESSURE: 123 MMHG | RESPIRATION RATE: 17 BRPM

## 2018-09-07 DIAGNOSIS — M79.601 RIGHT UPPER LIMB PAIN: Primary | ICD-10-CM

## 2018-09-07 DIAGNOSIS — I70.208: ICD-10-CM

## 2018-09-07 DIAGNOSIS — I70.208 BRACHIAL ARTERY OCCLUSION, RIGHT (HCC): Primary | ICD-10-CM

## 2018-09-07 DIAGNOSIS — I70.208 RADIAL ARTERY OCCLUSION, RIGHT (HCC): ICD-10-CM

## 2018-09-07 DIAGNOSIS — M79.601 RIGHT UPPER LIMB PAIN: ICD-10-CM

## 2018-09-07 LAB
ALBUMIN SERPL-MCNC: 4 G/DL (ref 3.5–5)
ALBUMIN/GLOB SERPL: 0.9 G/DL (ref 1–2)
ALP SERPL-CCNC: 104 U/L (ref 38–126)
ALT SERPL W P-5'-P-CCNC: 18 U/L (ref 13–69)
ANION GAP SERPL CALCULATED.3IONS-SCNC: 14.7 MMOL/L (ref 10–20)
APTT PPP: 25.4 SECONDS (ref 25–36)
AST SERPL-CCNC: 31 U/L (ref 15–46)
BASOPHILS # BLD AUTO: 0.09 10*3/MM3 (ref 0–0.2)
BASOPHILS NFR BLD AUTO: 0.4 % (ref 0–2.5)
BILIRUB SERPL-MCNC: 0.6 MG/DL (ref 0.2–1.3)
BUN BLD-MCNC: 9 MG/DL (ref 7–20)
BUN/CREAT SERPL: 10 (ref 6.3–21.9)
CALCIUM SPEC-SCNC: 9.7 MG/DL (ref 8.4–10.2)
CHLORIDE SERPL-SCNC: 98 MMOL/L (ref 98–107)
CO2 SERPL-SCNC: 29 MMOL/L (ref 26–30)
CREAT BLD-MCNC: 0.9 MG/DL (ref 0.6–1.3)
DEPRECATED RDW RBC AUTO: 44 FL (ref 37–54)
EOSINOPHIL # BLD AUTO: 0.14 10*3/MM3 (ref 0–0.7)
EOSINOPHIL NFR BLD AUTO: 0.7 % (ref 0–7)
ERYTHROCYTE [DISTWIDTH] IN BLOOD BY AUTOMATED COUNT: 15.8 % (ref 11.5–14.5)
GFR SERPL CREATININE-BSD FRML MDRD: 89 ML/MIN/1.73
GLOBULIN UR ELPH-MCNC: 4.7 GM/DL
GLUCOSE BLD-MCNC: 127 MG/DL (ref 74–98)
HCT VFR BLD AUTO: 41.6 % (ref 42–52)
HGB BLD-MCNC: 13.1 G/DL (ref 14–18)
IMM GRANULOCYTES # BLD: 0.11 10*3/MM3 (ref 0–0.06)
IMM GRANULOCYTES NFR BLD: 0.5 % (ref 0–0.6)
INR PPP: 1.3 (ref 0.9–1.1)
LYMPHOCYTES # BLD AUTO: 3.06 10*3/MM3 (ref 0.6–3.4)
LYMPHOCYTES NFR BLD AUTO: 14.6 % (ref 10–50)
MCH RBC QN AUTO: 24.3 PG (ref 27–31)
MCHC RBC AUTO-ENTMCNC: 31.5 G/DL (ref 30–37)
MCV RBC AUTO: 77 FL (ref 80–94)
MONOCYTES # BLD AUTO: 1.34 10*3/MM3 (ref 0–0.9)
MONOCYTES NFR BLD AUTO: 6.4 % (ref 0–12)
NEUTROPHILS # BLD AUTO: 16.27 10*3/MM3 (ref 2–6.9)
NEUTROPHILS NFR BLD AUTO: 77.4 % (ref 37–80)
NRBC BLD MANUAL-RTO: 0 /100 WBC (ref 0–0)
PLATELET # BLD AUTO: 332 10*3/MM3 (ref 130–400)
PMV BLD AUTO: 8.7 FL (ref 6–12)
POTASSIUM BLD-SCNC: 4.7 MMOL/L (ref 3.5–5.1)
PROT SERPL-MCNC: 8.7 G/DL (ref 6.3–8.2)
PROTHROMBIN TIME: 14.5 SECONDS (ref 9.3–12.1)
RBC # BLD AUTO: 5.4 10*6/MM3 (ref 4.7–6.1)
SODIUM BLD-SCNC: 137 MMOL/L (ref 137–145)
WBC NRBC COR # BLD: 21.01 10*3/MM3 (ref 4.8–10.8)

## 2018-09-07 PROCEDURE — 96376 TX/PRO/DX INJ SAME DRUG ADON: CPT

## 2018-09-07 PROCEDURE — 99283 EMERGENCY DEPT VISIT LOW MDM: CPT

## 2018-09-07 PROCEDURE — 80053 COMPREHEN METABOLIC PANEL: CPT | Performed by: PHYSICIAN ASSISTANT

## 2018-09-07 PROCEDURE — 93971 EXTREMITY STUDY: CPT

## 2018-09-07 PROCEDURE — 85025 COMPLETE CBC W/AUTO DIFF WBC: CPT | Performed by: PHYSICIAN ASSISTANT

## 2018-09-07 PROCEDURE — 85730 THROMBOPLASTIN TIME PARTIAL: CPT | Performed by: PHYSICIAN ASSISTANT

## 2018-09-07 PROCEDURE — 25010000002 HEPARIN (PORCINE) PER 1000 UNITS: Performed by: PHYSICIAN ASSISTANT

## 2018-09-07 PROCEDURE — 85610 PROTHROMBIN TIME: CPT | Performed by: PHYSICIAN ASSISTANT

## 2018-09-07 PROCEDURE — 96365 THER/PROPH/DIAG IV INF INIT: CPT

## 2018-09-07 RX ORDER — HEPARIN SODIUM 10000 [USP'U]/100ML
14.7 INJECTION, SOLUTION INTRAVENOUS
Status: DISCONTINUED | OUTPATIENT
Start: 2018-09-07 | End: 2018-09-07 | Stop reason: HOSPADM

## 2018-09-07 RX ORDER — SODIUM CHLORIDE 0.9 % (FLUSH) 0.9 %
10 SYRINGE (ML) INJECTION AS NEEDED
Status: DISCONTINUED | OUTPATIENT
Start: 2018-09-07 | End: 2018-09-07 | Stop reason: HOSPADM

## 2018-09-07 RX ORDER — HEPARIN SODIUM 1000 [USP'U]/ML
4000 INJECTION, SOLUTION INTRAVENOUS; SUBCUTANEOUS ONCE
Status: COMPLETED | OUTPATIENT
Start: 2018-09-07 | End: 2018-09-07

## 2018-09-07 RX ADMIN — SODIUM CHLORIDE 1000 ML: 9 INJECTION, SOLUTION INTRAVENOUS at 14:35

## 2018-09-07 RX ADMIN — HEPARIN SODIUM 14.7 UNITS/KG/HR: 10000 INJECTION, SOLUTION INTRAVENOUS at 14:35

## 2018-09-07 RX ADMIN — HEPARIN SODIUM 4000 UNITS: 1000 INJECTION, SOLUTION INTRAVENOUS; SUBCUTANEOUS at 14:35

## 2018-09-07 NOTE — ED PROVIDER NOTES
"Subjective   This is a 53 y/o male that comes in with c/c \"right arm pain and swelling\" X 2 weeks. Patient states that he hit his arm in his sleep several weeks ago. Has had increased pain and swelling to arm. Denies any history of blood clots.         History provided by:  Patient   used: No    Upper Extremity Issue   Location:  Arm  Arm location:  R arm  Injury: no    Pain details:     Quality:  Aching, shooting and throbbing    Radiates to:  R arm    Severity:  Moderate    Onset quality:  Sudden    Duration:  2 weeks    Timing:  Intermittent    Progression:  Worsening  Dislocation: no    Foreign body present:  No foreign bodies  Tetanus status:  Unknown  Prior injury to area:  No  Relieved by:  Nothing  Worsened by:  Nothing  Associated symptoms: swelling    Associated symptoms: no back pain, no decreased range of motion, no fatigue and no fever    Risk factors: no concern for non-accidental trauma and no frequent fractures        Review of Systems   Constitutional: Negative for fatigue and fever.   Musculoskeletal: Positive for joint swelling and myalgias. Negative for back pain.   All other systems reviewed and are negative.      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 (CMS/HCC)    • Migraine headache    • Pectoralis muscle rupture    • Tinnitus    • Triceps reflex reduced    • Unsp injury of musc/fasc/tend triceps, right arm, init        Allergies   Allergen Reactions   • Eye Drops [Naphazoline-Polyethyl Glycol] Rash       Past Surgical History:   Procedure Laterality Date   • APPENDECTOMY     • BACK SURGERY      Tail bone   • EYE SURGERY      cornea transplant; insertion of ocular implant   • INTERVENTIONAL RADIOLOGY PROCEDURE Bilateral 8/22/2016    Procedure:  myelogram cervical with fluoroscopy to be " preformed by  in the Cath lab - w/ a f/u CT;  Surgeon: Bryan Miller MD;  Location: [facility] INVASIVE LOCATION;  Service:        Family History   Problem Relation Age of Onset   • Arthritis Mother    • COPD Father    • Hypertension Father    • Diabetes Father    • Arthritis Other    • Hyperlipidemia Other    • Hypertension Other    • Arthritis Sister    • Hypertension Sister    • Diabetes Maternal Grandmother    • No Known Problems Maternal Grandfather    • No Known Problems Paternal Grandmother    • No Known Problems Paternal Grandfather    • No Known Problems Sister    • No Known Problems Sister    • No Known Problems Sister        Social History     Social History   • Marital status:      Social History Main Topics   • Smoking status: Current Every Day Smoker     Packs/day: 1.00     Years: 30.00     Types: Cigarettes     Start date: 1981   • Smokeless tobacco: Never Used   • Alcohol use Yes      Comment: rare, maybe 1 every few months   • Drug use: Yes     Types: Marijuana, Methamphetamines, IV   • Sexual activity: Yes     Partners: Female     Other Topics Concern   • Not on file           Objective   Physical Exam   Constitutional: He is oriented to person, place, and time. He appears well-developed and well-nourished. No distress.   HENT:   Head: Normocephalic.   Right Ear: External ear normal.   Left Ear: External ear normal.   Nose: Nose normal.   Mouth/Throat: Oropharynx is clear and moist. No oropharyngeal exudate.   Eyes: Pupils are equal, round, and reactive to light. EOM are normal. Right eye exhibits no discharge. No scleral icterus.   Neck: Normal range of motion. Neck supple. No JVD present. No tracheal deviation present. No thyromegaly present.   Cardiovascular: Normal rate, regular rhythm, normal heart sounds and intact distal pulses.    No murmur heard.  Pulses:       Radial pulses are 0 on the right side.   Pulmonary/Chest: Effort normal and breath sounds normal. No stridor. No  respiratory distress. He has no wheezes. He has no rales. He exhibits no tenderness.   Abdominal: Soft. Bowel sounds are normal. He exhibits no distension and no mass. There is no tenderness. There is no guarding.   Musculoskeletal:        Right elbow: He exhibits decreased range of motion and swelling.        Right wrist: He exhibits decreased range of motion and tenderness.   Neurological: He is alert and oriented to person, place, and time. He displays normal reflexes. No cranial nerve deficit. He exhibits normal muscle tone. Coordination normal.   Skin: Skin is warm and dry. Capillary refill takes less than 2 seconds. He is not diaphoretic. No erythema. No pallor.   Psychiatric: He has a normal mood and affect. His behavior is normal. Judgment and thought content normal.   Nursing note and vitals reviewed.      Procedures           ED Course  ED Course as of Sep 07 2142   Fri Sep 07, 2018   1341 Discussed care with Dr. Narvaez, advised to transfer to  due to extensive arterial occlusion.   []   1400 Call placed to [facility] for vascular surgery.   []   1410 Discussed care with Dr. Jennings  vascular surgeon. Will be transferred to  for occlusion of right bracial, radial and ulnar artery of right arm.   []      ED Course User Index  [] Mamadou Gomez PA-C                  [facility]      Final diagnoses:   Brachial artery occlusion, right (CMS/HCC)   Radial artery occlusion, right (CMS/HCC)   Ulnar artery obstruction, right (CMS/HCC)            Mamadou Gomez PA-C  09/07/18 1652

## 2019-06-07 ENCOUNTER — TRANSCRIBE ORDERS (OUTPATIENT)
Dept: ADMINISTRATIVE | Facility: HOSPITAL | Age: 54
End: 2019-06-07

## 2019-06-07 DIAGNOSIS — Z95.2 HEART VALVE REPLACED BY TRANSPLANT: Primary | ICD-10-CM

## 2019-06-20 ENCOUNTER — HOSPITAL ENCOUNTER (OUTPATIENT)
Dept: CT IMAGING | Facility: HOSPITAL | Age: 54
Discharge: HOME | End: 2019-06-20
Admitting: INTERNAL MEDICINE

## 2019-06-20 DIAGNOSIS — Z95.2 HEART VALVE REPLACED BY TRANSPLANT: ICD-10-CM

## 2019-06-20 LAB — CREAT BLDA-MCNC: 0.8 MG/DL (ref 0.6–1.3)

## 2019-06-20 PROCEDURE — 25010000002 IOPAMIDOL 61 % SOLUTION: Performed by: INTERNAL MEDICINE

## 2019-06-20 PROCEDURE — 71270 CT THORAX DX C-/C+: CPT

## 2019-06-20 PROCEDURE — 82565 ASSAY OF CREATININE: CPT

## 2019-06-20 RX ADMIN — IOPAMIDOL 100 ML: 612 INJECTION, SOLUTION INTRAVENOUS at 09:15

## 2019-06-25 ENCOUNTER — APPOINTMENT (OUTPATIENT)
Dept: CT IMAGING | Facility: HOSPITAL | Age: 54
End: 2019-06-25

## 2019-07-30 ENCOUNTER — TRANSCRIBE ORDERS (OUTPATIENT)
Dept: ULTRASOUND IMAGING | Facility: HOSPITAL | Age: 54
End: 2019-07-30

## 2019-07-30 DIAGNOSIS — I74.2 EMBOLISM AND THROMBOSIS OF ARTERIES OF UPPER EXTREMITY (HCC): Primary | ICD-10-CM

## 2019-08-01 ENCOUNTER — APPOINTMENT (OUTPATIENT)
Dept: ULTRASOUND IMAGING | Facility: HOSPITAL | Age: 54
End: 2019-08-01

## 2019-08-05 ENCOUNTER — HOSPITAL ENCOUNTER (OUTPATIENT)
Dept: ULTRASOUND IMAGING | Facility: HOSPITAL | Age: 54
Discharge: HOME | End: 2019-08-05
Admitting: INTERNAL MEDICINE

## 2019-08-05 DIAGNOSIS — I74.2 EMBOLISM AND THROMBOSIS OF ARTERIES OF UPPER EXTREMITY (HCC): ICD-10-CM

## 2019-08-05 PROCEDURE — 93931 UPPER EXTREMITY STUDY: CPT

## 2019-09-06 ENCOUNTER — HOSPITAL ENCOUNTER (EMERGENCY)
Facility: HOSPITAL | Age: 54
Discharge: HOME | End: 2019-09-07
Attending: EMERGENCY MEDICINE | Admitting: EMERGENCY MEDICINE

## 2019-09-06 DIAGNOSIS — R07.9 CHEST PAIN, UNSPECIFIED TYPE: Primary | ICD-10-CM

## 2019-09-06 LAB
BASOPHILS # BLD AUTO: 0.12 10*3/MM3 (ref 0–0.2)
BASOPHILS NFR BLD AUTO: 0.7 % (ref 0–1.5)
DEPRECATED RDW RBC AUTO: 44.5 FL (ref 37–54)
EOSINOPHIL # BLD AUTO: 0.35 10*3/MM3 (ref 0–0.4)
EOSINOPHIL NFR BLD AUTO: 2.1 % (ref 0.3–6.2)
ERYTHROCYTE [DISTWIDTH] IN BLOOD BY AUTOMATED COUNT: 13.9 % (ref 12.3–15.4)
HCT VFR BLD AUTO: 49.2 % (ref 37.5–51)
HGB BLD-MCNC: 16.6 G/DL (ref 13–17.7)
IMM GRANULOCYTES # BLD AUTO: 0.05 10*3/MM3 (ref 0–0.05)
IMM GRANULOCYTES NFR BLD AUTO: 0.3 % (ref 0–0.5)
LYMPHOCYTES # BLD AUTO: 5.23 10*3/MM3 (ref 0.7–3.1)
LYMPHOCYTES NFR BLD AUTO: 31.4 % (ref 19.6–45.3)
MCH RBC QN AUTO: 29.3 PG (ref 26.6–33)
MCHC RBC AUTO-ENTMCNC: 33.7 G/DL (ref 31.5–35.7)
MCV RBC AUTO: 86.8 FL (ref 79–97)
MONOCYTES # BLD AUTO: 1.47 10*3/MM3 (ref 0.1–0.9)
MONOCYTES NFR BLD AUTO: 8.8 % (ref 5–12)
NEUTROPHILS # BLD AUTO: 9.46 10*3/MM3 (ref 1.7–7)
NEUTROPHILS NFR BLD AUTO: 56.7 % (ref 42.7–76)
NRBC BLD AUTO-RTO: 0 /100 WBC (ref 0–0.2)
PLATELET # BLD AUTO: 252 10*3/MM3 (ref 140–450)
PMV BLD AUTO: 10 FL (ref 6–12)
RBC # BLD AUTO: 5.67 10*6/MM3 (ref 4.14–5.8)
WBC NRBC COR # BLD: 16.68 10*3/MM3 (ref 3.4–10.8)

## 2019-09-06 PROCEDURE — 93005 ELECTROCARDIOGRAM TRACING: CPT | Performed by: EMERGENCY MEDICINE

## 2019-09-06 PROCEDURE — 85025 COMPLETE CBC W/AUTO DIFF WBC: CPT | Performed by: EMERGENCY MEDICINE

## 2019-09-06 PROCEDURE — 84484 ASSAY OF TROPONIN QUANT: CPT | Performed by: EMERGENCY MEDICINE

## 2019-09-06 PROCEDURE — 99284 EMERGENCY DEPT VISIT MOD MDM: CPT

## 2019-09-06 PROCEDURE — 85610 PROTHROMBIN TIME: CPT | Performed by: EMERGENCY MEDICINE

## 2019-09-06 PROCEDURE — 80053 COMPREHEN METABOLIC PANEL: CPT | Performed by: EMERGENCY MEDICINE

## 2019-09-07 ENCOUNTER — APPOINTMENT (OUTPATIENT)
Dept: GENERAL RADIOLOGY | Facility: HOSPITAL | Age: 54
End: 2019-09-07

## 2019-09-07 VITALS
DIASTOLIC BLOOD PRESSURE: 90 MMHG | HEART RATE: 96 BPM | OXYGEN SATURATION: 94 % | SYSTOLIC BLOOD PRESSURE: 136 MMHG | HEIGHT: 72 IN | RESPIRATION RATE: 16 BRPM | TEMPERATURE: 98.4 F | BODY MASS INDEX: 35.08 KG/M2 | WEIGHT: 259 LBS

## 2019-09-07 LAB
ALBUMIN SERPL-MCNC: 4.8 G/DL (ref 3.5–5.2)
ALBUMIN/GLOB SERPL: 1.1 G/DL
ALP SERPL-CCNC: 104 U/L (ref 39–117)
ALT SERPL W P-5'-P-CCNC: 43 U/L (ref 1–41)
ANION GAP SERPL CALCULATED.3IONS-SCNC: 16.9 MMOL/L (ref 5–15)
AST SERPL-CCNC: 34 U/L (ref 1–40)
BILIRUB SERPL-MCNC: 0.7 MG/DL (ref 0.2–1.2)
BUN BLD-MCNC: 16 MG/DL (ref 6–20)
BUN/CREAT SERPL: 14.5 (ref 7–25)
CALCIUM SPEC-SCNC: 10.3 MG/DL (ref 8.6–10.5)
CHLORIDE SERPL-SCNC: 102 MMOL/L (ref 98–107)
CO2 SERPL-SCNC: 22.1 MMOL/L (ref 22–29)
CREAT BLD-MCNC: 1.1 MG/DL (ref 0.76–1.27)
GFR SERPL CREATININE-BSD FRML MDRD: 70 ML/MIN/1.73
GLOBULIN UR ELPH-MCNC: 4.3 GM/DL
GLUCOSE BLD-MCNC: 144 MG/DL (ref 65–99)
HOLD SPECIMEN: NORMAL
INR PPP: 2.1 (ref 0.9–1.1)
POTASSIUM BLD-SCNC: 4.2 MMOL/L (ref 3.5–5.2)
PROT SERPL-MCNC: 9.1 G/DL (ref 6–8.5)
PROTHROMBIN TIME: 24.1 SECONDS (ref 12–15.1)
SODIUM BLD-SCNC: 141 MMOL/L (ref 136–145)
TROPONIN T SERPL-MCNC: <0.01 NG/ML (ref 0–0.03)
WHOLE BLOOD HOLD SPECIMEN: NORMAL
WHOLE BLOOD HOLD SPECIMEN: NORMAL

## 2019-09-07 PROCEDURE — 71045 X-RAY EXAM CHEST 1 VIEW: CPT

## 2019-09-07 NOTE — ED PROVIDER NOTES
Subjective   53-year-old male who is brought in in police custody presents initially stating that he wants his blood pressure checked.  Patient then went on to state that he is having chest pain.  Patient notes that he has retrosternal achiness.  Present for multiple days.  Patient notes that he is going on strike and stopped using his Coumadin, this morning at 5 AM.  He denies shortness of breath.  No wheeze.  No cough.  No nausea vomiting diarrhea or abdominal pain.  No other complaints at this time.            Review of Systems   Constitutional: Negative for fatigue and fever.   Respiratory: Negative for cough, choking, chest tightness, shortness of breath and wheezing.    Cardiovascular: Positive for chest pain.   All other systems reviewed and are negative.      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 (CMS/HCC)    • Migraine headache    • Pectoralis muscle rupture    • Tinnitus    • Triceps reflex reduced    • Unsp injury of musc/fasc/tend triceps, right arm, init        Allergies   Allergen Reactions   • Eye Drops [Naphazoline-Polyethyl Glycol] Rash       Past Surgical History:   Procedure Laterality Date   • APPENDECTOMY     • BACK SURGERY      Tail bone   • CARDIAC VALVE REPLACEMENT     • EYE SURGERY      cornea transplant; insertion of ocular implant   • INTERVENTIONAL RADIOLOGY PROCEDURE Bilateral 8/22/2016    Procedure:  myelogram cervical with fluoroscopy to be preformed by  in the Cath lab - w/ a f/u CT;  Surgeon: Bryan Miller MD;  Location: [facility] INVASIVE LOCATION;  Service:        Family History   Problem Relation Age of Onset   • Arthritis Mother    • COPD Father    • Hypertension Father    • Diabetes Father    • Arthritis Other    • Hyperlipidemia Other    • Hypertension Other    • Arthritis Sister     • Hypertension Sister    • Diabetes Maternal Grandmother    • No Known Problems Maternal Grandfather    • No Known Problems Paternal Grandmother    • No Known Problems Paternal Grandfather    • No Known Problems Sister    • No Known Problems Sister    • No Known Problems Sister        Social History     Socioeconomic History   • Marital status:      Spouse name: Not on file   • Number of children: Not on file   • Years of education: Not on file   • Highest education level: Not on file   Tobacco Use   • Smoking status: Current Every Day Smoker     Packs/day: 2.00     Years: 30.00     Pack years: 60.00     Types: Cigarettes     Start date: 1981   • Smokeless tobacco: Never Used   Substance and Sexual Activity   • Alcohol use: Yes     Comment: rare, maybe 1 every few months   • Drug use: Yes     Types: Marijuana   • Sexual activity: Yes     Partners: Female           Objective   Physical Exam   Constitutional: He is oriented to person, place, and time. He appears well-developed and well-nourished. No distress.   HENT:   Head: Normocephalic and atraumatic.   Nose: Nose normal.   Eyes: Conjunctivae and EOM are normal. Pupils are equal, round, and reactive to light.   Cardiovascular: Normal rate and regular rhythm.   Pulmonary/Chest: Effort normal and breath sounds normal. No respiratory distress.   Abdominal: Soft. He exhibits no distension. There is no tenderness.   Musculoskeletal: He exhibits no edema or deformity.   Neurological: He is alert and oriented to person, place, and time. No cranial nerve deficit. Coordination normal.   Skin: He is not diaphoretic.   Nursing note and vitals reviewed.      Procedures           ED Course  ED Course as of Sep 07 0208   Sat Sep 07, 2019   0009 EKG interpreted by me.  Sinus rhythm.  Tachycardic.  Short KS interval.  Occasional PVC.  Right particular conduction delay.  No ST segment or T wave abnormalities.  Abnormal EKG  [CG]   0010 Initially the patient was refusing  any blood work or blood draws.  He was refusing EKG and chest x-ray.  Told the patient that he can leave with the police or allow us to complete her work-up.  He then agreed to work-up.  [CG]      ED Course User Index  [CG] Jean-Claude Solano DO          Patient brought in for medical clearance then suddenly began complaining of chest pain.  He states that it has been there for multiple days.  EKG nonischemic.  Chest x-ray unremarkable.  Troponin negative.  Patient will be discharged in police custody.        MDM    Final diagnoses:   Chest pain, unspecified type              Jean-Claude Solano DO  09/07/19 0208

## 2020-01-02 ENCOUNTER — HOSPITAL ENCOUNTER (EMERGENCY)
Facility: HOSPITAL | Age: 55
Discharge: HOME | End: 2020-01-03
Attending: STUDENT IN AN ORGANIZED HEALTH CARE EDUCATION/TRAINING PROGRAM | Admitting: STUDENT IN AN ORGANIZED HEALTH CARE EDUCATION/TRAINING PROGRAM

## 2020-01-02 DIAGNOSIS — R07.9 CHEST PAIN, UNSPECIFIED TYPE: Primary | ICD-10-CM

## 2020-01-02 PROCEDURE — 80053 COMPREHEN METABOLIC PANEL: CPT | Performed by: STUDENT IN AN ORGANIZED HEALTH CARE EDUCATION/TRAINING PROGRAM

## 2020-01-02 PROCEDURE — 84484 ASSAY OF TROPONIN QUANT: CPT | Performed by: STUDENT IN AN ORGANIZED HEALTH CARE EDUCATION/TRAINING PROGRAM

## 2020-01-02 PROCEDURE — 93005 ELECTROCARDIOGRAM TRACING: CPT | Performed by: STUDENT IN AN ORGANIZED HEALTH CARE EDUCATION/TRAINING PROGRAM

## 2020-01-02 PROCEDURE — 99283 EMERGENCY DEPT VISIT LOW MDM: CPT

## 2020-01-02 PROCEDURE — 85025 COMPLETE CBC W/AUTO DIFF WBC: CPT | Performed by: STUDENT IN AN ORGANIZED HEALTH CARE EDUCATION/TRAINING PROGRAM

## 2020-01-02 RX ORDER — ASPIRIN 325 MG
325 TABLET ORAL ONCE
Status: COMPLETED | OUTPATIENT
Start: 2020-01-02 | End: 2020-01-03

## 2020-01-02 RX ORDER — SODIUM CHLORIDE 0.9 % (FLUSH) 0.9 %
10 SYRINGE (ML) INJECTION AS NEEDED
Status: DISCONTINUED | OUTPATIENT
Start: 2020-01-02 | End: 2020-01-03 | Stop reason: HOSPADM

## 2020-01-03 ENCOUNTER — APPOINTMENT (OUTPATIENT)
Dept: GENERAL RADIOLOGY | Facility: HOSPITAL | Age: 55
End: 2020-01-03

## 2020-01-03 VITALS
RESPIRATION RATE: 20 BRPM | HEIGHT: 72 IN | SYSTOLIC BLOOD PRESSURE: 124 MMHG | WEIGHT: 264.6 LBS | TEMPERATURE: 98 F | OXYGEN SATURATION: 90 % | BODY MASS INDEX: 35.84 KG/M2 | HEART RATE: 79 BPM | DIASTOLIC BLOOD PRESSURE: 72 MMHG

## 2020-01-03 LAB
ALBUMIN SERPL-MCNC: 4.5 G/DL (ref 3.5–5.2)
ALBUMIN/GLOB SERPL: 1.1 G/DL
ALP SERPL-CCNC: 86 U/L (ref 39–117)
ALT SERPL W P-5'-P-CCNC: 40 U/L (ref 1–41)
ANION GAP SERPL CALCULATED.3IONS-SCNC: 15 MMOL/L (ref 5–15)
AST SERPL-CCNC: 34 U/L (ref 1–40)
BASOPHILS # BLD AUTO: 0.08 10*3/MM3 (ref 0–0.2)
BASOPHILS NFR BLD AUTO: 0.6 % (ref 0–1.5)
BILIRUB SERPL-MCNC: 0.5 MG/DL (ref 0.2–1.2)
BUN BLD-MCNC: 14 MG/DL (ref 6–20)
BUN/CREAT SERPL: 17.7 (ref 7–25)
CALCIUM SPEC-SCNC: 10.1 MG/DL (ref 8.6–10.5)
CHLORIDE SERPL-SCNC: 100 MMOL/L (ref 98–107)
CO2 SERPL-SCNC: 25 MMOL/L (ref 22–29)
CREAT BLD-MCNC: 0.79 MG/DL (ref 0.76–1.27)
DEPRECATED RDW RBC AUTO: 41.9 FL (ref 37–54)
EOSINOPHIL # BLD AUTO: 0.22 10*3/MM3 (ref 0–0.4)
EOSINOPHIL NFR BLD AUTO: 1.8 % (ref 0.3–6.2)
ERYTHROCYTE [DISTWIDTH] IN BLOOD BY AUTOMATED COUNT: 12.9 % (ref 12.3–15.4)
GFR SERPL CREATININE-BSD FRML MDRD: 103 ML/MIN/1.73
GLOBULIN UR ELPH-MCNC: 4.2 GM/DL
GLUCOSE BLD-MCNC: 106 MG/DL (ref 65–99)
HCT VFR BLD AUTO: 44.7 % (ref 37.5–51)
HGB BLD-MCNC: 15.2 G/DL (ref 13–17.7)
HOLD SPECIMEN: NORMAL
HOLD SPECIMEN: NORMAL
IMM GRANULOCYTES # BLD AUTO: 0.03 10*3/MM3 (ref 0–0.05)
IMM GRANULOCYTES NFR BLD AUTO: 0.2 % (ref 0–0.5)
LYMPHOCYTES # BLD AUTO: 4.44 10*3/MM3 (ref 0.7–3.1)
LYMPHOCYTES NFR BLD AUTO: 36 % (ref 19.6–45.3)
MCH RBC QN AUTO: 29.9 PG (ref 26.6–33)
MCHC RBC AUTO-ENTMCNC: 34 G/DL (ref 31.5–35.7)
MCV RBC AUTO: 87.8 FL (ref 79–97)
MONOCYTES # BLD AUTO: 1.22 10*3/MM3 (ref 0.1–0.9)
MONOCYTES NFR BLD AUTO: 9.9 % (ref 5–12)
NEUTROPHILS # BLD AUTO: 6.35 10*3/MM3 (ref 1.7–7)
NEUTROPHILS NFR BLD AUTO: 51.5 % (ref 42.7–76)
NRBC BLD AUTO-RTO: 0 /100 WBC (ref 0–0.2)
PLATELET # BLD AUTO: 245 10*3/MM3 (ref 140–450)
PMV BLD AUTO: 9.4 FL (ref 6–12)
POTASSIUM BLD-SCNC: 4.2 MMOL/L (ref 3.5–5.2)
PROT SERPL-MCNC: 8.7 G/DL (ref 6–8.5)
RBC # BLD AUTO: 5.09 10*6/MM3 (ref 4.14–5.8)
SODIUM BLD-SCNC: 140 MMOL/L (ref 136–145)
TROPONIN T SERPL-MCNC: <0.01 NG/ML (ref 0–0.03)
WBC NRBC COR # BLD: 12.34 10*3/MM3 (ref 3.4–10.8)
WHOLE BLOOD HOLD SPECIMEN: NORMAL
WHOLE BLOOD HOLD SPECIMEN: NORMAL

## 2020-01-03 PROCEDURE — 71045 X-RAY EXAM CHEST 1 VIEW: CPT

## 2020-01-03 RX ORDER — WARFARIN SODIUM 5 MG/1
TABLET ORAL
Qty: 60 TABLET | Refills: 0 | Status: SHIPPED | OUTPATIENT
Start: 2020-01-03

## 2020-01-03 RX ADMIN — ASPIRIN 325 MG ORAL TABLET 325 MG: 325 PILL ORAL at 00:40

## 2020-01-03 NOTE — ED PROVIDER NOTES
Subjective   Patient is a 54-year-old male who presents to the emergency department after being released from local [facility] after being incarcerated there for 3 days for violation of his probation.  Patient states he has been there many times this 1 was related to alcohol consumption.  Patient states he did take his medications to the [facility] where they withheld them from him.  He also reports when he was discharged from the [facility] a few hours ago they did not give him his medications.  States he began to have chest pain earlier today before he was released from [facility] and estimates that this was greater than 9 hours ago.  States is more of a chest pressure that is substernal.  Nothing makes this better or worse.          Review of Systems   All other systems reviewed and are negative.      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)    • Migraine headache    • Pectoralis muscle rupture    • Tinnitus    • Triceps reflex reduced    • Unsp injury of musc/fasc/tend triceps, right arm, init        Allergies   Allergen Reactions   • Eye Drops [Naphazoline-Polyethyl Glycol] Rash       Past Surgical History:   Procedure Laterality Date   • APPENDECTOMY     • BACK SURGERY      Tail bone   • CARDIAC VALVE REPLACEMENT     • EYE SURGERY      cornea transplant; insertion of ocular implant   • INTERVENTIONAL RADIOLOGY PROCEDURE Bilateral 8/22/2016    Procedure:  myelogram cervical with fluoroscopy to be preformed by  in the Cath lab - w/ a f/u CT;  Surgeon: Bryan Miller MD;  Location: [facility] INVASIVE LOCATION;  Service:        Family History   Problem Relation Age of Onset   • Arthritis Mother    • COPD Father    • Hypertension Father    • Diabetes Father    • Arthritis Other    • Hyperlipidemia Other    • Hypertension Other     • Arthritis Sister    • Hypertension Sister    • Diabetes Maternal Grandmother    • No Known Problems Maternal Grandfather    • No Known Problems Paternal Grandmother    • No Known Problems Paternal Grandfather    • No Known Problems Sister    • No Known Problems Sister    • No Known Problems Sister        Social History     Socioeconomic History   • Marital status:      Spouse name: Not on file   • Number of children: Not on file   • Years of education: Not on file   • Highest education level: Not on file   Tobacco Use   • Smoking status: Current Every Day Smoker     Packs/day: 2.00     Years: 30.00     Pack years: 60.00     Types: Cigarettes     Start date: 1981   • Smokeless tobacco: Never Used   Substance and Sexual Activity   • Alcohol use: Not Currently     Comment: rare, maybe 1 every few months   • Drug use: Yes     Types: Marijuana   • Sexual activity: Defer     Partners: Female           Objective   Physical Exam   Nursing note and vitals reviewed.      GEN: No acute distress  Head: Normocephalic, atraumatic  Eyes: Pupils equal round reactive to light  ENT: Posterior pharynx normal in appearance, oral mucosa is moist  Chest: Nontender to palpation  Cardiovascular: Regular rate  Lungs: Clear to auscultation bilaterally  Abdomen: Soft, nontender, nondistended, no peritoneal signs  Extremities: No edema, normal appearance  Neuro: GCS 15  Psych: Mood and affect are appropriate      Procedures           ED Course  ED Course as of Jan 03 0149 Fri Jan 03, 2020   0106 EKG shows a sinus rhythm with a rate of 93.  RSR pattern in V1 V2 consistent with right ventricular ductal delay.  No significant ST segments.  Abnormal EKG.  Interpreted by me.    [DT]      ED Course User Index  [DT] Sukumar Daniels MD                                               MDM  Number of Diagnoses or Management Options  Chest pain, unspecified type:   Diagnosis management comments: Differential diagnosis for this patient would  include acute coronary syndrome, pulmonary embolus, thoracic aortic dissection, pericarditis, pneumonia, pneumothorax, Boerhaave syndrome, or other concerns.  Patient's chest x-ray shows no evidence of infiltrate or pneumothorax with a normal mediastinum.  I doubt thoracic aortic dissection as the pain was not maximal in onset, ripping or tearing, did not migrate, along with a normal mediastinum on chest x-ray.  At this time the pain would be atypical for pulmonary embolus as it does not have a pleuritic component and is intermittent.  Patient is >3 hours with continuous pain.  Troponin will be drawn to assess for acute MI.  Patient needs to go back to the residential get his medications.  I will give him a refill of his warfarin as I feel this is a very high risk medication for this patient.       Amount and/or Complexity of Data Reviewed  Clinical lab tests: reviewed  Decide to obtain previous medical records or to obtain history from someone other than the patient: yes  Obtain history from someone other than the patient: yes  Review and summarize past medical records: yes  Independent visualization of images, tracings, or specimens: yes        Final diagnoses:   Chest pain, unspecified type            Sukumar Daniels MD  01/03/20 0317

## 2021-10-16 ENCOUNTER — APPOINTMENT (OUTPATIENT)
Dept: GENERAL RADIOLOGY | Facility: HOSPITAL | Age: 56
End: 2021-10-16

## 2021-10-16 ENCOUNTER — HOSPITAL ENCOUNTER (EMERGENCY)
Facility: HOSPITAL | Age: 56
Discharge: HOME | End: 2021-10-16
Attending: EMERGENCY MEDICINE | Admitting: EMERGENCY MEDICINE

## 2021-10-16 VITALS
TEMPERATURE: 98.7 F | DIASTOLIC BLOOD PRESSURE: 69 MMHG | HEIGHT: 72 IN | BODY MASS INDEX: 31.15 KG/M2 | WEIGHT: 230 LBS | RESPIRATION RATE: 17 BRPM | HEART RATE: 56 BPM | SYSTOLIC BLOOD PRESSURE: 102 MMHG | OXYGEN SATURATION: 94 %

## 2021-10-16 DIAGNOSIS — R07.9 CHEST PAIN, UNSPECIFIED TYPE: Primary | ICD-10-CM

## 2021-10-16 LAB
ALBUMIN SERPL-MCNC: 4.4 G/DL (ref 3.5–5.2)
ALBUMIN/GLOB SERPL: 1.3 G/DL
ALP SERPL-CCNC: 98 U/L (ref 39–117)
ALT SERPL W P-5'-P-CCNC: 37 U/L (ref 1–41)
ANION GAP SERPL CALCULATED.3IONS-SCNC: 9.1 MMOL/L (ref 5–15)
APTT PPP: 36.8 SECONDS (ref 24.5–37.2)
AST SERPL-CCNC: 29 U/L (ref 1–40)
BASOPHILS # BLD AUTO: 0.14 10*3/MM3 (ref 0–0.2)
BASOPHILS NFR BLD AUTO: 1.1 % (ref 0–1.5)
BILIRUB SERPL-MCNC: 0.5 MG/DL (ref 0–1.2)
BILIRUB UR QL STRIP: NEGATIVE
BUN SERPL-MCNC: 10 MG/DL (ref 6–20)
BUN/CREAT SERPL: 13.5 (ref 7–25)
CALCIUM SPEC-SCNC: 9.4 MG/DL (ref 8.6–10.5)
CHLORIDE SERPL-SCNC: 101 MMOL/L (ref 98–107)
CLARITY UR: CLEAR
CO2 SERPL-SCNC: 28.9 MMOL/L (ref 22–29)
COLOR UR: YELLOW
CREAT SERPL-MCNC: 0.74 MG/DL (ref 0.76–1.27)
DEPRECATED RDW RBC AUTO: 45.6 FL (ref 37–54)
EOSINOPHIL # BLD AUTO: 1.02 10*3/MM3 (ref 0–0.4)
EOSINOPHIL NFR BLD AUTO: 8.1 % (ref 0.3–6.2)
ERYTHROCYTE [DISTWIDTH] IN BLOOD BY AUTOMATED COUNT: 14.6 % (ref 12.3–15.4)
GFR SERPL CREATININE-BSD FRML MDRD: 110 ML/MIN/1.73
GLOBULIN UR ELPH-MCNC: 3.5 GM/DL
GLUCOSE SERPL-MCNC: 100 MG/DL (ref 65–99)
GLUCOSE UR STRIP-MCNC: NEGATIVE MG/DL
HCT VFR BLD AUTO: 46.6 % (ref 37.5–51)
HGB BLD-MCNC: 15.8 G/DL (ref 13–17.7)
HGB UR QL STRIP.AUTO: NEGATIVE
HOLD SPECIMEN: NORMAL
HOLD SPECIMEN: NORMAL
IMM GRANULOCYTES # BLD AUTO: 0.05 10*3/MM3 (ref 0–0.05)
IMM GRANULOCYTES NFR BLD AUTO: 0.4 % (ref 0–0.5)
INR PPP: 1.87 (ref 0.9–1.1)
KETONES UR QL STRIP: NEGATIVE
LEUKOCYTE ESTERASE UR QL STRIP.AUTO: NEGATIVE
LIPASE SERPL-CCNC: 44 U/L (ref 13–60)
LYMPHOCYTES # BLD AUTO: 3.73 10*3/MM3 (ref 0.7–3.1)
LYMPHOCYTES NFR BLD AUTO: 29.5 % (ref 19.6–45.3)
MAGNESIUM SERPL-MCNC: 1.8 MG/DL (ref 1.6–2.6)
MCH RBC QN AUTO: 29 PG (ref 26.6–33)
MCHC RBC AUTO-ENTMCNC: 33.9 G/DL (ref 31.5–35.7)
MCV RBC AUTO: 85.7 FL (ref 79–97)
MONOCYTES # BLD AUTO: 0.9 10*3/MM3 (ref 0.1–0.9)
MONOCYTES NFR BLD AUTO: 7.1 % (ref 5–12)
NEUTROPHILS NFR BLD AUTO: 53.8 % (ref 42.7–76)
NEUTROPHILS NFR BLD AUTO: 6.82 10*3/MM3 (ref 1.7–7)
NITRITE UR QL STRIP: NEGATIVE
NRBC BLD AUTO-RTO: 0 /100 WBC (ref 0–0.2)
NT-PROBNP SERPL-MCNC: 197 PG/ML (ref 0–900)
PH UR STRIP.AUTO: 5.5 [PH] (ref 5–8)
PLATELET # BLD AUTO: 230 10*3/MM3 (ref 140–450)
PMV BLD AUTO: 9.8 FL (ref 6–12)
POTASSIUM SERPL-SCNC: 4.6 MMOL/L (ref 3.5–5.2)
PROT SERPL-MCNC: 7.9 G/DL (ref 6–8.5)
PROT UR QL STRIP: NEGATIVE
PROTHROMBIN TIME: 22.1 SECONDS (ref 12–15.1)
RBC # BLD AUTO: 5.44 10*6/MM3 (ref 4.14–5.8)
SODIUM SERPL-SCNC: 139 MMOL/L (ref 136–145)
SP GR UR STRIP: 1.01 (ref 1–1.03)
TROPONIN T SERPL-MCNC: <0.01 NG/ML (ref 0–0.03)
UROBILINOGEN UR QL STRIP: NORMAL
WBC # BLD AUTO: 12.66 10*3/MM3 (ref 3.4–10.8)
WHOLE BLOOD HOLD SPECIMEN: NORMAL
WHOLE BLOOD HOLD SPECIMEN: NORMAL

## 2021-10-16 PROCEDURE — 93005 ELECTROCARDIOGRAM TRACING: CPT | Performed by: EMERGENCY MEDICINE

## 2021-10-16 PROCEDURE — 85025 COMPLETE CBC W/AUTO DIFF WBC: CPT | Performed by: EMERGENCY MEDICINE

## 2021-10-16 PROCEDURE — 83735 ASSAY OF MAGNESIUM: CPT | Performed by: NURSE PRACTITIONER

## 2021-10-16 PROCEDURE — 96375 TX/PRO/DX INJ NEW DRUG ADDON: CPT

## 2021-10-16 PROCEDURE — 80053 COMPREHEN METABOLIC PANEL: CPT | Performed by: EMERGENCY MEDICINE

## 2021-10-16 PROCEDURE — 84484 ASSAY OF TROPONIN QUANT: CPT | Performed by: EMERGENCY MEDICINE

## 2021-10-16 PROCEDURE — 99284 EMERGENCY DEPT VISIT MOD MDM: CPT

## 2021-10-16 PROCEDURE — 84484 ASSAY OF TROPONIN QUANT: CPT | Performed by: NURSE PRACTITIONER

## 2021-10-16 PROCEDURE — 25010000002 ONDANSETRON PER 1 MG: Performed by: NURSE PRACTITIONER

## 2021-10-16 PROCEDURE — 96374 THER/PROPH/DIAG INJ IV PUSH: CPT

## 2021-10-16 PROCEDURE — 71045 X-RAY EXAM CHEST 1 VIEW: CPT

## 2021-10-16 PROCEDURE — 83690 ASSAY OF LIPASE: CPT | Performed by: NURSE PRACTITIONER

## 2021-10-16 PROCEDURE — 81003 URINALYSIS AUTO W/O SCOPE: CPT | Performed by: NURSE PRACTITIONER

## 2021-10-16 PROCEDURE — 85610 PROTHROMBIN TIME: CPT | Performed by: NURSE PRACTITIONER

## 2021-10-16 PROCEDURE — 83880 ASSAY OF NATRIURETIC PEPTIDE: CPT | Performed by: NURSE PRACTITIONER

## 2021-10-16 PROCEDURE — 85730 THROMBOPLASTIN TIME PARTIAL: CPT | Performed by: NURSE PRACTITIONER

## 2021-10-16 RX ORDER — ONDANSETRON 2 MG/ML
4 INJECTION INTRAMUSCULAR; INTRAVENOUS ONCE
Status: COMPLETED | OUTPATIENT
Start: 2021-10-16 | End: 2021-10-16

## 2021-10-16 RX ORDER — ASPIRIN 325 MG
325 TABLET ORAL ONCE
Status: COMPLETED | OUTPATIENT
Start: 2021-10-16 | End: 2021-10-16

## 2021-10-16 RX ORDER — FAMOTIDINE 10 MG/ML
20 INJECTION, SOLUTION INTRAVENOUS ONCE
Status: COMPLETED | OUTPATIENT
Start: 2021-10-16 | End: 2021-10-16

## 2021-10-16 RX ORDER — SODIUM CHLORIDE 0.9 % (FLUSH) 0.9 %
10 SYRINGE (ML) INJECTION AS NEEDED
Status: DISCONTINUED | OUTPATIENT
Start: 2021-10-16 | End: 2021-10-16 | Stop reason: HOSPADM

## 2021-10-16 RX ADMIN — ASPIRIN 325 MG ORAL TABLET 325 MG: 325 PILL ORAL at 16:28

## 2021-10-16 RX ADMIN — ONDANSETRON 4 MG: 2 INJECTION INTRAMUSCULAR; INTRAVENOUS at 16:45

## 2021-10-16 RX ADMIN — FAMOTIDINE 20 MG: 10 INJECTION INTRAVENOUS at 16:45

## 2021-10-16 NOTE — ED PROVIDER NOTES
"Subjective   55 year old male presents today for c/o chest pain for 2 days. He has some increase in chest pain with exertion with one episode of vomiting yesterday. He said he felt better once he threw up. He denies headache, dizziness. He has history of having 2 valves replaced and is on coumadin. He has never had an MI or stroke. He tells me he has had \"pin strokes\". No other associated s/s.           Review of Systems   Constitutional: Negative.    HENT: Negative.    Eyes: Negative.    Respiratory: Positive for chest tightness.    Cardiovascular: Positive for chest pain.   Gastrointestinal: Positive for nausea.   Endocrine: Negative.    Genitourinary: Negative.    Musculoskeletal: Negative.    Skin: Negative.    Allergic/Immunologic: Negative.    Neurological: Positive for dizziness. Negative for headaches.   Hematological: Negative.    Psychiatric/Behavioral: Negative.        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 (HCC)    • Migraine headache    • Pectoralis muscle rupture    • Tinnitus    • Triceps reflex reduced    • Unsp injury of musc/fasc/tend triceps, right arm, init        Allergies   Allergen Reactions   • Eye Drops [Naphazoline-Polyethyl Glycol] Rash       Past Surgical History:   Procedure Laterality Date   • APPENDECTOMY     • BACK SURGERY      Tail bone   • CARDIAC VALVE REPLACEMENT     • EYE SURGERY      cornea transplant; insertion of ocular implant   • INTERVENTIONAL RADIOLOGY PROCEDURE Bilateral 8/22/2016    Procedure:  myelogram cervical with fluoroscopy to be preformed by  in the Cath lab - w/ a f/u CT;  Surgeon: Bryan Miller MD;  Location: [facility] CATH INVASIVE LOCATION;  Service:        Family History   Problem Relation Age of Onset   • Arthritis Mother    • COPD Father    • Hypertension Father  "   • Diabetes Father    • Arthritis Other    • Hyperlipidemia Other    • Hypertension Other    • Arthritis Sister    • Hypertension Sister    • Diabetes Maternal Grandmother    • No Known Problems Maternal Grandfather    • No Known Problems Paternal Grandmother    • No Known Problems Paternal Grandfather    • No Known Problems Sister    • No Known Problems Sister    • No Known Problems Sister        Social History     Socioeconomic History   • Marital status:    Tobacco Use   • Smoking status: Current Every Day Smoker     Packs/day: 2.00     Years: 30.00     Pack years: 60.00     Types: Cigarettes     Start date: 1981   • Smokeless tobacco: Never Used   Vaping Use   • Vaping Use: Never used   Substance and Sexual Activity   • Alcohol use: Not Currently     Comment: rare, maybe 1 every few months   • Drug use: Yes     Types: Marijuana   • Sexual activity: Defer     Partners: Female           Objective   Physical Exam  Constitutional:       Appearance: He is well-developed. He is obese.   HENT:      Head: Atraumatic.   Eyes:      Extraocular Movements: Extraocular movements intact.      Pupils: Pupils are equal, round, and reactive to light.   Cardiovascular:      Rate and Rhythm: Normal rate and regular rhythm.      Heart sounds: Normal heart sounds.   Pulmonary:      Effort: Pulmonary effort is normal. No respiratory distress.      Breath sounds: Normal breath sounds.   Chest:      Chest wall: No tenderness.   Abdominal:      General: Bowel sounds are normal.      Palpations: Abdomen is soft.   Musculoskeletal:         General: Normal range of motion.      Cervical back: Normal range of motion.   Skin:     General: Skin is warm and dry.      Capillary Refill: Capillary refill takes less than 2 seconds.   Neurological:      Mental Status: He is alert and oriented to person, place, and time.   Psychiatric:         Mood and Affect: Mood normal.         Behavior: Behavior normal.         Procedures           ED  Course  ED Course as of 10/16/21 2022   Sat Oct 16, 2021   1802 EKG interpreted by me reveals supraventricular rhythm with a rate of 74 bpm.  There are some nonspecific ST-T wave changes.  This is an abnormal appearing EKG. [TB]      ED Course User Index  [TB] Jacinda Guerra MD                                         HEART Score (for prediction of 6-week risk of major adverse cardiac event) reviewed and/or performed as part of the patient evaluation and treatment planning process.  The result associated with this review/performance is: 3       MDM  Number of Diagnoses or Management Options     Amount and/or Complexity of Data Reviewed  Clinical lab tests: reviewed    patient left against medical advice, I was unable to talk to patient to explain risks against leaving before he left    Final diagnoses:   Chest pain, unspecified type       ED Disposition  ED Disposition     ED Disposition Condition Comment    Discharge Stable           Robert Miller MD  [address] [place] [address] [phone]               Medication List      No changes were made to your prescriptions during this visit.          Susana Baez, APRN  10/16/21 2026

## 2022-07-13 ENCOUNTER — HOSPITAL ENCOUNTER (EMERGENCY)
Facility: HOSPITAL | Age: 57
Discharge: HOME | End: 2022-07-14
Attending: EMERGENCY MEDICINE | Admitting: EMERGENCY MEDICINE

## 2022-07-13 VITALS
HEIGHT: 72 IN | OXYGEN SATURATION: 95 % | SYSTOLIC BLOOD PRESSURE: 150 MMHG | TEMPERATURE: 98.3 F | HEART RATE: 107 BPM | RESPIRATION RATE: 18 BRPM | DIASTOLIC BLOOD PRESSURE: 80 MMHG | WEIGHT: 257.8 LBS | BODY MASS INDEX: 34.92 KG/M2

## 2022-07-13 DIAGNOSIS — S80.12XA CONTUSION OF LEFT LOWER EXTREMITY, INITIAL ENCOUNTER: Primary | ICD-10-CM

## 2022-07-13 PROCEDURE — 99282 EMERGENCY DEPT VISIT SF MDM: CPT

## 2022-07-13 RX ORDER — ASPIRIN 81 MG/1
81 TABLET ORAL DAILY
COMMUNITY

## 2022-07-13 RX ORDER — GLIPIZIDE 5 MG/1
5 TABLET ORAL
COMMUNITY

## 2022-07-13 RX ORDER — FUROSEMIDE 20 MG/1
20 TABLET ORAL 2 TIMES DAILY
COMMUNITY

## 2022-07-14 ENCOUNTER — APPOINTMENT (OUTPATIENT)
Dept: GENERAL RADIOLOGY | Facility: HOSPITAL | Age: 57
End: 2022-07-14

## 2022-07-14 PROCEDURE — 73590 X-RAY EXAM OF LOWER LEG: CPT

## 2022-07-14 NOTE — ED PROVIDER NOTES
Subjective   Chief Complaint: Left leg pain    History of Present Illness: This is a 56-year-old male patient comes into the ED today complaining of left leg pain with some minor swelling in his feet.  Patient states he has no pain behind his knee or calf but has area about the size of a quarter on his medial side of his distal tib-fib that is painful to touch.  Patient does not remember hitting his leg on anything.  Patient is Coumadin    Nurses Notes reviewed and agree, including vitals, allergies, social history and prior medical history.                Review of Systems   Musculoskeletal: Positive for joint swelling and myalgias.   All other systems reviewed and are negative.      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 (HCC)    • Migraine headache    • Pectoralis muscle rupture    • Tinnitus    • Triceps reflex reduced    • Unsp injury of musc/fasc/tend triceps, right arm, init        Allergies   Allergen Reactions   • Eye Drops [Naphazoline-Polyethyl Glycol] Rash       Past Surgical History:   Procedure Laterality Date   • APPENDECTOMY     • BACK SURGERY      Tail bone   • CARDIAC VALVE REPLACEMENT     • EYE SURGERY      cornea transplant; insertion of ocular implant   • INTERVENTIONAL RADIOLOGY PROCEDURE Bilateral 8/22/2016    Procedure:  myelogram cervical with fluoroscopy to be preformed by  in the Cath lab - w/ a f/u CT;  Surgeon: Bryan Miller MD;  Location: [facility] INVASIVE LOCATION;  Service:        Family History   Problem Relation Age of Onset   • Arthritis Mother    • COPD Father    • Hypertension Father    • Diabetes Father    • Arthritis Other    • Hyperlipidemia Other    • Hypertension Other    • Arthritis Sister    • Hypertension Sister    • Diabetes Maternal Grandmother    • No Known Problems  Maternal Grandfather    • No Known Problems Paternal Grandmother    • No Known Problems Paternal Grandfather    • No Known Problems Sister    • No Known Problems Sister    • No Known Problems Sister        Social History     Socioeconomic History   • Marital status:    Tobacco Use   • Smoking status: Current Every Day Smoker     Packs/day: 2.00     Years: 30.00     Pack years: 60.00     Types: Cigarettes     Start date: 1981   • Smokeless tobacco: Never Used   Vaping Use   • Vaping Use: Never used   Substance and Sexual Activity   • Alcohol use: Not Currently     Comment: rare, maybe 1 every few months   • Drug use: Yes     Types: Marijuana   • Sexual activity: Defer     Partners: Female           Objective   Physical Exam  Vitals and nursing note reviewed.   Constitutional:       Appearance: Normal appearance.   HENT:      Head: Normocephalic and atraumatic.   Eyes:      Extraocular Movements: Extraocular movements intact.      Pupils: Pupils are equal, round, and reactive to light.   Cardiovascular:      Rate and Rhythm: Normal rate and regular rhythm.      Pulses: Normal pulses.      Heart sounds: Normal heart sounds.   Pulmonary:      Effort: Pulmonary effort is normal.      Breath sounds: Normal breath sounds.   Abdominal:      General: Bowel sounds are normal.      Palpations: Abdomen is soft.   Musculoskeletal:         General: Normal range of motion.      Cervical back: Normal range of motion and neck supple.      Comments: Mild tenderness to palpation along the medial side of the distal left tib-fib   Skin:     General: Skin is warm and dry.      Capillary Refill: Capillary refill takes less than 2 seconds.      Findings: Bruising present.   Neurological:      Mental Status: He is alert.      GCS: GCS eye subscore is 4. GCS verbal subscore is 5. GCS motor subscore is 6.      Cranial Nerves: Cranial nerves are intact.      Sensory: Sensation is intact.      Motor: Motor function is intact.    Psychiatric:         Attention and Perception: Attention and perception normal.         Mood and Affect: Mood and affect normal.         Speech: Speech normal.         Procedures           ED Course                                           MDM    Final diagnoses:   Contusion of left lower extremity, initial encounter       ED Disposition  ED Disposition     ED Disposition   Discharge    Condition   Stable    Comment   --             Robert Miller MD  [address] [place] [address] [phone]    In 1 week  Follow-up         Medication List      No changes were made to your prescriptions during this visit.          Wilian Pacheco, SHERWIN  07/14/22 0036

## 2023-08-14 ENCOUNTER — TELEPHONE (OUTPATIENT)
Dept: PHARMACY | Facility: HOSPITAL | Age: 58
End: 2023-08-14
Payer: MEDICAID

## 2023-08-14 NOTE — TELEPHONE ENCOUNTER
Received call from Care Manager Lizzy with Dr Fernandez Araujo's ([facility]) office. Patient is needing remote INR follow up. [facility] to fax copy of CCA and referral form to their office for Dr Araujo to complete - fax: [phone].    Guicho Haile, PharmD, BCPS  8/14/2023  13:40 EDT

## 2023-08-22 NOTE — TELEPHONE ENCOUNTER
Received referral and CCA signed yesterday 8/23.    Called patient [phone]. No answer and unable to LVM.     Referred by Dr. Fernandez Florez 8/18/2023- indefinite duration of anticoagulation therapy.     Valvular disease- Stage D3= low flow, low gradient severe AS with normal LVEF per [facility]    Initial anticoag start date 8/21/2023

## 2023-08-25 NOTE — TELEPHONE ENCOUNTER
Spoke with Deidra ([phone]) at Dr Araujo's office regarding clinic inability to accept new patients from outside providers/non [facility] [place]. She is aware and will find other warfarin mgmt for patient

## 2023-09-19 NOTE — TELEPHONE ENCOUNTER
Received call from Lizzy at Dr Araujo's office regarding potentially following patient and getting set up with a home monitor. See above regarding clinic inability to accept new patients from outside providers/non [facility]. Discussed that some of our patient's use [facility] and [employer]. She will look into these options. Patient unable to apparently get monitor through [facility] due to Medicaid insurance.    Guicho Haile, PharmD, BCPS  9/19/2023  10:34 EDT

## 2024-01-09 ENCOUNTER — APPOINTMENT (OUTPATIENT)
Dept: GENERAL RADIOLOGY | Facility: HOSPITAL | Age: 59
End: 2024-01-09
Payer: MEDICAID

## 2024-01-09 ENCOUNTER — HOSPITAL ENCOUNTER (EMERGENCY)
Facility: HOSPITAL | Age: 59
Discharge: HOME | End: 2024-01-09
Attending: EMERGENCY MEDICINE | Admitting: EMERGENCY MEDICINE
Payer: MEDICAID

## 2024-01-09 VITALS
HEART RATE: 112 BPM | BODY MASS INDEX: 32.51 KG/M2 | TEMPERATURE: 98.1 F | WEIGHT: 240 LBS | SYSTOLIC BLOOD PRESSURE: 131 MMHG | RESPIRATION RATE: 18 BRPM | OXYGEN SATURATION: 94 % | HEIGHT: 72 IN | DIASTOLIC BLOOD PRESSURE: 86 MMHG

## 2024-01-09 DIAGNOSIS — R55 SYNCOPE, UNSPECIFIED SYNCOPE TYPE: ICD-10-CM

## 2024-01-09 DIAGNOSIS — U07.1 COVID-19: Primary | ICD-10-CM

## 2024-01-09 DIAGNOSIS — R73.9 HYPERGLYCEMIA: ICD-10-CM

## 2024-01-09 LAB
ALBUMIN SERPL-MCNC: 4.4 G/DL (ref 3.5–5.2)
ALBUMIN/GLOB SERPL: 1.3 G/DL
ALP SERPL-CCNC: 132 U/L (ref 39–117)
ALT SERPL W P-5'-P-CCNC: 34 U/L (ref 1–41)
ANION GAP SERPL CALCULATED.3IONS-SCNC: 10.4 MMOL/L (ref 5–15)
AST SERPL-CCNC: 22 U/L (ref 1–40)
BASOPHILS # BLD AUTO: 0.08 10*3/MM3 (ref 0–0.2)
BASOPHILS NFR BLD AUTO: 0.9 % (ref 0–1.5)
BILIRUB SERPL-MCNC: 0.4 MG/DL (ref 0–1.2)
BUN SERPL-MCNC: 11 MG/DL (ref 6–20)
BUN/CREAT SERPL: 14.3 (ref 7–25)
CALCIUM SPEC-SCNC: 8.7 MG/DL (ref 8.6–10.5)
CHLORIDE SERPL-SCNC: 101 MMOL/L (ref 98–107)
CO2 SERPL-SCNC: 26.6 MMOL/L (ref 22–29)
CREAT SERPL-MCNC: 0.77 MG/DL (ref 0.76–1.27)
DEPRECATED RDW RBC AUTO: 44 FL (ref 37–54)
EGFRCR SERPLBLD CKD-EPI 2021: 104.4 ML/MIN/1.73
EOSINOPHIL # BLD AUTO: 0.26 10*3/MM3 (ref 0–0.4)
EOSINOPHIL NFR BLD AUTO: 2.8 % (ref 0.3–6.2)
ERYTHROCYTE [DISTWIDTH] IN BLOOD BY AUTOMATED COUNT: 14.5 % (ref 12.3–15.4)
FLUAV RNA RESP QL NAA+PROBE: NOT DETECTED
FLUBV RNA RESP QL NAA+PROBE: NOT DETECTED
GLOBULIN UR ELPH-MCNC: 3.3 GM/DL
GLUCOSE SERPL-MCNC: 181 MG/DL (ref 65–99)
HCT VFR BLD AUTO: 44.1 % (ref 37.5–51)
HGB BLD-MCNC: 15.5 G/DL (ref 13–17.7)
HOLD SPECIMEN: NORMAL
HOLD SPECIMEN: NORMAL
IMM GRANULOCYTES # BLD AUTO: 0.05 10*3/MM3 (ref 0–0.05)
IMM GRANULOCYTES NFR BLD AUTO: 0.5 % (ref 0–0.5)
INR PPP: 1.55 (ref 0.9–1.1)
LYMPHOCYTES # BLD AUTO: 1.98 10*3/MM3 (ref 0.7–3.1)
LYMPHOCYTES NFR BLD AUTO: 21.2 % (ref 19.6–45.3)
MAGNESIUM SERPL-MCNC: 1.6 MG/DL (ref 1.6–2.6)
MCH RBC QN AUTO: 29.5 PG (ref 26.6–33)
MCHC RBC AUTO-ENTMCNC: 35.1 G/DL (ref 31.5–35.7)
MCV RBC AUTO: 84 FL (ref 79–97)
MONOCYTES # BLD AUTO: 1.31 10*3/MM3 (ref 0.1–0.9)
MONOCYTES NFR BLD AUTO: 14 % (ref 5–12)
NEUTROPHILS NFR BLD AUTO: 5.67 10*3/MM3 (ref 1.7–7)
NEUTROPHILS NFR BLD AUTO: 60.6 % (ref 42.7–76)
NRBC BLD AUTO-RTO: 0 /100 WBC (ref 0–0.2)
PLATELET # BLD AUTO: 214 10*3/MM3 (ref 140–450)
PMV BLD AUTO: 9.6 FL (ref 6–12)
POTASSIUM SERPL-SCNC: 4 MMOL/L (ref 3.5–5.2)
PROT SERPL-MCNC: 7.7 G/DL (ref 6–8.5)
PROTHROMBIN TIME: 19.2 SECONDS (ref 12.3–15.1)
RBC # BLD AUTO: 5.25 10*6/MM3 (ref 4.14–5.8)
SARS-COV-2 RNA RESP QL NAA+PROBE: DETECTED
SODIUM SERPL-SCNC: 138 MMOL/L (ref 136–145)
TROPONIN T SERPL HS-MCNC: 11 NG/L
WBC NRBC COR # BLD AUTO: 9.35 10*3/MM3 (ref 3.4–10.8)
WHOLE BLOOD HOLD COAG: NORMAL
WHOLE BLOOD HOLD SPECIMEN: NORMAL

## 2024-01-09 PROCEDURE — 96374 THER/PROPH/DIAG INJ IV PUSH: CPT

## 2024-01-09 PROCEDURE — 83735 ASSAY OF MAGNESIUM: CPT

## 2024-01-09 PROCEDURE — 71045 X-RAY EXAM CHEST 1 VIEW: CPT

## 2024-01-09 PROCEDURE — 85025 COMPLETE CBC W/AUTO DIFF WBC: CPT

## 2024-01-09 PROCEDURE — 80053 COMPREHEN METABOLIC PANEL: CPT

## 2024-01-09 PROCEDURE — 25810000003 SODIUM CHLORIDE 0.9 % SOLUTION

## 2024-01-09 PROCEDURE — 93005 ELECTROCARDIOGRAM TRACING: CPT | Performed by: EMERGENCY MEDICINE

## 2024-01-09 PROCEDURE — 84484 ASSAY OF TROPONIN QUANT: CPT

## 2024-01-09 PROCEDURE — 85610 PROTHROMBIN TIME: CPT | Performed by: EMERGENCY MEDICINE

## 2024-01-09 PROCEDURE — 99284 EMERGENCY DEPT VISIT MOD MDM: CPT

## 2024-01-09 PROCEDURE — 87636 SARSCOV2 & INF A&B AMP PRB: CPT | Performed by: EMERGENCY MEDICINE

## 2024-01-09 PROCEDURE — 36415 COLL VENOUS BLD VENIPUNCTURE: CPT

## 2024-01-09 PROCEDURE — 96361 HYDRATE IV INFUSION ADD-ON: CPT

## 2024-01-09 PROCEDURE — 93005 ELECTROCARDIOGRAM TRACING: CPT

## 2024-01-09 PROCEDURE — 25010000002 ORPHENADRINE CITRATE PER 60 MG

## 2024-01-09 RX ORDER — SODIUM CHLORIDE 0.9 % (FLUSH) 0.9 %
10 SYRINGE (ML) INJECTION AS NEEDED
Status: DISCONTINUED | OUTPATIENT
Start: 2024-01-09 | End: 2024-01-09 | Stop reason: HOSPADM

## 2024-01-09 RX ORDER — ACETAMINOPHEN 325 MG/1
975 TABLET ORAL ONCE
Status: COMPLETED | OUTPATIENT
Start: 2024-01-09 | End: 2024-01-09

## 2024-01-09 RX ORDER — ORPHENADRINE CITRATE 30 MG/ML
60 INJECTION INTRAMUSCULAR; INTRAVENOUS ONCE
Status: COMPLETED | OUTPATIENT
Start: 2024-01-09 | End: 2024-01-09

## 2024-01-09 RX ADMIN — ACETAMINOPHEN 975 MG: 325 TABLET, FILM COATED ORAL at 15:05

## 2024-01-09 RX ADMIN — SODIUM CHLORIDE 500 ML: 9 INJECTION, SOLUTION INTRAVENOUS at 15:03

## 2024-01-09 RX ADMIN — ORPHENADRINE CITRATE 60 MG: 60 INJECTION INTRAMUSCULAR; INTRAVENOUS at 15:05

## 2024-01-09 NOTE — ED PROVIDER NOTES
Subjective  History of Present Illness:    This is a 58-year-old male, history of arthritis, drug abuse, infection viral hepatitis, migraine headaches, tendinitis, on anticoagulation with warfarin due to heart valve, emergency room today for evaluation of syncope.  Patient reports that he felt like his heart was not beating right after he got out of the [facility] and had a minor syncopal episode but he was sitting down did not hit his head.  No headaches.  Reports slight cough, exposure to COVID within the last few days.  No fevers.  No chest pain or shortness of air.  No unilateral leg pain or swelling.  Reports that when he felt lightheaded and passed out that he took his blood pressure and systolic read 70.  Reported associated nausea on his syncopal episode on Saturday after getting out of the [facility] but no further nausea.  No abdominal pain.  Reports lightheaded feelings but no vertiginous symptoms.  Also reporting myalgias of the back.  Patient reports some recent medication changes while at the [facility] but unsure what they were.      Nurses Notes reviewed and agree, including vitals, allergies, social history and prior medical history.     REVIEW OF SYSTEMS: All systems reviewed and not pertinent unless noted.  Review of Systems   Constitutional:  Negative for fever.   Respiratory:  Positive for cough. Negative for shortness of breath.    Cardiovascular:  Positive for palpitations. Negative for chest pain and leg swelling.   Gastrointestinal:  Positive for nausea. Negative for abdominal pain, diarrhea and vomiting.   Neurological:  Positive for light-headedness. Negative for dizziness.   All other systems reviewed and are negative.      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     Migraine headache     Pectoralis muscle rupture     Tinnitus      "Triceps reflex reduced     Unsp injury of musc/fasc/tend triceps, right arm, init        Allergies:    Eye drops [naphazoline-polyethyl glycol]      Past Surgical History:   Procedure Laterality Date    APPENDECTOMY      BACK SURGERY      Tail bone    CARDIAC VALVE REPLACEMENT      EYE SURGERY      cornea transplant; insertion of ocular implant    INTERVENTIONAL RADIOLOGY PROCEDURE Bilateral 8/22/2016    Procedure:  myelogram cervical with fluoroscopy to be preformed by  in the Cath lab - w/ a f/u CT;  Surgeon: Bryan Miller MD;  Location: [facility] CATH INVASIVE LOCATION;  Service:          Social History     Socioeconomic History    Marital status:    Tobacco Use    Smoking status: Every Day     Packs/day: 2.00     Years: 30.00     Additional pack years: 0.00     Total pack years: 60.00     Types: Cigarettes     Start date: 1981    Smokeless tobacco: Never   Vaping Use    Vaping Use: Never used   Substance and Sexual Activity    Alcohol use: Not Currently     Comment: rare, maybe 1 every few months    Drug use: Yes     Types: Marijuana    Sexual activity: Defer     Partners: Female         Family History   Problem Relation Age of Onset    Arthritis Mother     COPD Father     Hypertension Father     Diabetes Father     Arthritis Other     Hyperlipidemia Other     Hypertension Other     Arthritis Sister     Hypertension Sister     Diabetes Maternal Grandmother     No Known Problems Maternal Grandfather     No Known Problems Paternal Grandmother     No Known Problems Paternal Grandfather     No Known Problems Sister     No Known Problems Sister     No Known Problems Sister        Objective  Physical Exam:  /86   Pulse 112   Temp 98.1 °F (36.7 °C)   Resp 18   Ht 182.9 cm (72\")   Wt 109 kg (240 lb)   SpO2 94%   BMI 32.55 kg/m²      Physical Exam  Vitals and nursing note reviewed.   Constitutional:       General: He is not in acute distress.     Appearance: He is obese. He is not " ill-appearing, toxic-appearing or diaphoretic.   HENT:      Head: Normocephalic and atraumatic.      Right Ear: Tympanic membrane, ear canal and external ear normal.      Left Ear: Tympanic membrane, ear canal and external ear normal.      Nose: Congestion present.      Mouth/Throat:      Mouth: Mucous membranes are moist.      Pharynx: Oropharynx is clear.   Eyes:      Extraocular Movements: Extraocular movements intact.      Pupils: Pupils are equal, round, and reactive to light.   Cardiovascular:      Rate and Rhythm: Normal rate and regular rhythm.      Pulses: Normal pulses.      Heart sounds: Normal heart sounds.   Pulmonary:      Effort: Pulmonary effort is normal. No respiratory distress.      Breath sounds: Normal breath sounds. No stridor. No wheezing, rhonchi or rales.   Abdominal:      General: There is no distension.      Palpations: Abdomen is soft.      Tenderness: There is no abdominal tenderness. There is no guarding.   Musculoskeletal:         General: Normal range of motion.      Cervical back: Normal range of motion.   Skin:     General: Skin is warm and dry.      Capillary Refill: Capillary refill takes less than 2 seconds.   Neurological:      General: No focal deficit present.      Mental Status: He is alert and oriented to person, place, and time.      Cranial Nerves: No cranial nerve deficit.      Sensory: No sensory deficit.      Motor: No weakness.      Coordination: Coordination normal.   Psychiatric:         Mood and Affect: Mood normal.         Behavior: Behavior normal.         Thought Content: Thought content normal.         Judgment: Judgment normal.               Procedures    ED Course:    ED Course as of 01/09/24 1547   Tue Jan 09, 2024   1525 EKG interpreted by me reveals sinus rhythm with a rate of 99 bpm.  There are few nonspecific ST and T wave changes.  This is an abnormal appearing EKG. [TB]      ED Course User Index  [TB] Jacinda Guerra MD       Lab Results (last  24 hours)       Procedure Component Value Units Date/Time    CBC & Differential [[id number]]  (Abnormal) Collected: 01/09/24 1301    Specimen: Blood Updated: 01/09/24 1308    Narrative:      The following orders were created for panel order CBC & Differential.  Procedure                               Abnormality         Status                     ---------                               -----------         ------                     CBC Auto Differential[[id number]]        Abnormal            Final result                 Please view results for these tests on the individual orders.    Comprehensive Metabolic Panel [[id number]]  (Abnormal) Collected: 01/09/24 1301    Specimen: Blood Updated: 01/09/24 1337     Glucose 181 mg/dL      Comment: Glucose >180, Hemoglobin A1C recommended.        BUN 11 mg/dL      Creatinine 0.77 mg/dL      Sodium 138 mmol/L      Potassium 4.0 mmol/L      Chloride 101 mmol/L      CO2 26.6 mmol/L      Calcium 8.7 mg/dL      Total Protein 7.7 g/dL      Albumin 4.4 g/dL      ALT (SGPT) 34 U/L      AST (SGOT) 22 U/L      Alkaline Phosphatase 132 U/L      Total Bilirubin 0.4 mg/dL      Globulin 3.3 gm/dL      A/G Ratio 1.3 g/dL      BUN/Creatinine Ratio 14.3     Anion Gap 10.4 mmol/L      eGFR 104.4 mL/min/1.73     Narrative:      GFR Normal >60  Chronic Kidney Disease <60  Kidney Failure <15      Magnesium [[id number]]  (Normal) Collected: 01/09/24 1301    Specimen: Blood Updated: 01/09/24 1337     Magnesium 1.6 mg/dL     Single High Sensitivity Troponin T [[id number]]  (Normal) Collected: 01/09/24 1301    Specimen: Blood Updated: 01/09/24 1341     HS Troponin T 11 ng/L     Narrative:      High Sensitive Troponin T Reference Range:  <14.0 ng/L- Negative Female for AMI  <22.0 ng/L- Negative Male for AMI  >=14 - Abnormal Female indicating possible myocardial injury.  >=22 - Abnormal Male indicating possible myocardial injury.   Clinicians would have to utilize clinical acumen, EKG, Troponin, and serial  changes to determine if it is an Acute Myocardial Infarction or myocardial injury due to an underlying chronic condition.         CBC Auto Differential [[id number]]  (Abnormal) Collected: 01/09/24 1301    Specimen: Blood Updated: 01/09/24 1308     WBC 9.35 10*3/mm3      RBC 5.25 10*6/mm3      Hemoglobin 15.5 g/dL      Hematocrit 44.1 %      MCV 84.0 fL      MCH 29.5 pg      MCHC 35.1 g/dL      RDW 14.5 %      RDW-SD 44.0 fl      MPV 9.6 fL      Platelets 214 10*3/mm3      Neutrophil % 60.6 %      Lymphocyte % 21.2 %      Monocyte % 14.0 %      Eosinophil % 2.8 %      Basophil % 0.9 %      Immature Grans % 0.5 %      Neutrophils, Absolute 5.67 10*3/mm3      Lymphocytes, Absolute 1.98 10*3/mm3      Monocytes, Absolute 1.31 10*3/mm3      Eosinophils, Absolute 0.26 10*3/mm3      Basophils, Absolute 0.08 10*3/mm3      Immature Grans, Absolute 0.05 10*3/mm3      nRBC 0.0 /100 WBC     Protime-INR [[id number]]  (Abnormal) Collected: 01/09/24 1301    Specimen: Blood Updated: 01/09/24 1517     Protime 19.2 Seconds      INR 1.55    Narrative:      Suggested INR therapeutic range for stable oral anticoagulant therapy:    Low Intensity therapy:   1.5-2.0  Moderate Intensity therapy:   2.0-3.0  High Intensity therapy:   2.5-4.0    COVID-19 and FLU A/B PCR, 1 HR TAT - Swab, Nasopharynx [[id number]]  (Abnormal) Collected: 01/09/24 1439    Specimen: Swab from Nasopharynx Updated: 01/09/24 1515     COVID19 Detected     Influenza A PCR Not Detected     Influenza B PCR Not Detected    Narrative:      Fact sheet for providers: https://www.fda.gov/media/753098/download    Fact sheet for patients: https://www.fda.gov/media/628066/download    Test performed by PCR.  Influenza A and Influenza B negative results should be considered presumptive in samples that have a positive SARS-CoV-2 result.    Competitive inhibition studies showed that SARS-CoV-2 virus, when present at concentrations above 3.6E+04 copies/mL, can inhibit the detection and  amplification of influenza A and influenza B virus RNA if present at or below 1.8E+02 copies/mL or 4.9E+02 copies/mL, respectively, and may lead to false negative influenza virus results. If co-infection with influenza A or influenza B virus is suspected in samples with a positive SARS-CoV-2 result, the sample should be re-tested with another FDA cleared, approved, or authorized influenza test, if influenza virus detection would change clinical management.             XR Chest 1 View    Result Date: 1/9/2024  PROCEDURE: XR CHEST 1 VW-  HISTORY: cough syncope  COMPARISON: 10/16/2021  FINDINGS: No acute pulmonary opacity is present. There is no evidence of effusion or pneumothorax.    There is evidence of prior median sternotomy, presumably from mitral valve replacement. Otherwise, mediastinum is unremarkable.   Heart size is normal.      Impression: No acute finding    This report was signed and finalized on 1/9/2024 3:09 PM by Esteban Cohen MD.          MDM      Initial impression of presenting illness: Is a 58-year-old male present emergency room today for evaluation of syncope.    DDX: includes but is not limited to: COVID, flu, vasovagal syncope, medication induced hypotension, hypoglycemia viral illness, electrolyte abnormality, ACS, pneumothorax, pneumonia, viral upper respiratory tract infection, orthostatic hypotension, POTS, others    Lower concern for PE, on warfarin therapy, no unilateral leg pain or swelling no hypoxia no tachycardia no pleuritic chest pain or trouble breathing.    Patient arrives hemodynamically stable afebrile nontachycardic nonhypoxic and nontoxic-appearing with vitals interpreted by myself.     Pertinent features from physical exam: Cranial nerves II through XII gross intact.  Pupils PERRLA without nystagmus.  Equal strength upper lower extremities, coordination intact upper lower extremities..  Cardiac auscultation regular rate and rhythm lungs clear bilaterally.  Abdomen soft  nontender nondistended.    Initial diagnostic plan: CBC CMP magnesium troponin point-of-care pro time INR COVID flu testing chest x-ray EKG, orthostatic vitals, point-of-care glucose    Results from initial plan were reviewed and interpreted by me revealing CBC unremarkable.  CMP with a glucose of 181 and alkaline phosphatase of 132.  PT of 19.2 and INR of 1.55 indicating probable subtherapeutic level for warfarin..  Magnesium normal, COVID-19 detected.  EKG sinus rhythm rate of 99 nonspecific ST and T wave changes.  Chest x-ray per radiology with no acute findings.  Vital signs not orthostatic in nature.    Diagnostic information from other sources: Old record reviewed.    Interventions / Re-evaluation: 500 cc bolus of fluids, Norflex, Tylenol.  Feeling better after interventions stable for discharge.    Results/clinical rationale were discussed with patient at bedside.  Suspect symptoms likely related to COVID and possible vasovagal syncope.  He was subtherapeutic on his INR with an INR of 1.55, recommended the patient take 1 extra dose of his warfarin with his next dosing and follow-up for repeat PT/INR and begin his regular regimen of warfarin again with 10 mg daily.  He had no further questions.    Consultations/Discussion of results with other physicians: N/A    Disposition plan: Discharged with supportive care.  Return precautions given.  -----    Final diagnoses:   COVID-19   Syncope, unspecified syncope type   Hyperglycemia          Stephan Foster PA-C  01/09/24 1540

## 2024-01-09 NOTE — DISCHARGE INSTRUCTIONS
Follow-up closely with your primary care physician.  Make sure to hydrate appropriately.  Your INR was low today, recommend that you take 1 extra tablet with your next dose of warfarin.  For example it appears you take 2 tablets daily, the next dose you should take 3 tablets and then go back to your normal dosing of 2 tablets after that and repeat your PT/INR with your primary care.

## 2024-04-18 ENCOUNTER — TRANSCRIBE ORDERS (OUTPATIENT)
Dept: ADMINISTRATIVE | Facility: HOSPITAL | Age: 59
End: 2024-04-18
Payer: MEDICAID

## 2024-04-18 DIAGNOSIS — F17.200 TOBACCO USE DISORDER: Primary | ICD-10-CM

## 2024-04-18 DIAGNOSIS — Z87.891 PERSONAL HISTORY OF TOBACCO USE, PRESENTING HAZARDS TO HEALTH: ICD-10-CM

## 2025-04-12 ENCOUNTER — APPOINTMENT (OUTPATIENT)
Dept: CT IMAGING | Facility: HOSPITAL | Age: 60
End: 2025-04-12
Payer: MEDICAID

## 2025-04-12 ENCOUNTER — APPOINTMENT (OUTPATIENT)
Dept: GENERAL RADIOLOGY | Facility: HOSPITAL | Age: 60
End: 2025-04-12
Payer: MEDICAID

## 2025-04-12 ENCOUNTER — HOSPITAL ENCOUNTER (OUTPATIENT)
Facility: HOSPITAL | Age: 60
Setting detail: OBSERVATION
Discharge: HOME | End: 2025-04-13
Attending: EMERGENCY MEDICINE | Admitting: INTERNAL MEDICINE
Payer: MEDICAID

## 2025-04-12 DIAGNOSIS — J03.90 EXUDATIVE TONSILLITIS: ICD-10-CM

## 2025-04-12 DIAGNOSIS — R29.810 FACIAL DROOP: Primary | ICD-10-CM

## 2025-04-12 PROBLEM — I63.9 ISCHEMIC STROKE: Status: ACTIVE | Noted: 2025-04-12

## 2025-04-12 LAB
ALBUMIN SERPL-MCNC: 4.4 G/DL (ref 3.5–5.2)
ALBUMIN/GLOB SERPL: 1.3 G/DL
ALP SERPL-CCNC: 78 U/L (ref 39–117)
ALT SERPL W P-5'-P-CCNC: 16 U/L (ref 1–41)
ANION GAP SERPL CALCULATED.3IONS-SCNC: 10.9 MMOL/L (ref 5–15)
APTT PPP: 46 SECONDS (ref 23–35)
AST SERPL-CCNC: 16 U/L (ref 1–40)
BASOPHILS # BLD AUTO: 0.11 10*3/MM3 (ref 0–0.2)
BASOPHILS NFR BLD AUTO: 0.9 % (ref 0–1.5)
BILIRUB SERPL-MCNC: 0.3 MG/DL (ref 0–1.2)
BUN SERPL-MCNC: 8 MG/DL (ref 6–20)
BUN/CREAT SERPL: 7.6 (ref 7–25)
CALCIUM SPEC-SCNC: 9.4 MG/DL (ref 8.6–10.5)
CHLORIDE SERPL-SCNC: 100 MMOL/L (ref 98–107)
CO2 SERPL-SCNC: 27.1 MMOL/L (ref 22–29)
CREAT SERPL-MCNC: 1.05 MG/DL (ref 0.76–1.27)
DEPRECATED RDW RBC AUTO: 42.7 FL (ref 37–54)
EGFRCR SERPLBLD CKD-EPI 2021: 82.3 ML/MIN/1.73
EOSINOPHIL # BLD AUTO: 0.56 10*3/MM3 (ref 0–0.4)
EOSINOPHIL NFR BLD AUTO: 4.6 % (ref 0.3–6.2)
ERYTHROCYTE [DISTWIDTH] IN BLOOD BY AUTOMATED COUNT: 13.5 % (ref 12.3–15.4)
GLOBULIN UR ELPH-MCNC: 3.5 GM/DL
GLUCOSE BLDC GLUCOMTR-MCNC: 108 MG/DL (ref 70–130)
GLUCOSE SERPL-MCNC: 123 MG/DL (ref 65–99)
HCT VFR BLD AUTO: 43.7 % (ref 37.5–51)
HGB BLD-MCNC: 14.6 G/DL (ref 13–17.7)
HOLD SPECIMEN: NORMAL
HOLD SPECIMEN: NORMAL
IMM GRANULOCYTES # BLD AUTO: 0.06 10*3/MM3 (ref 0–0.05)
IMM GRANULOCYTES NFR BLD AUTO: 0.5 % (ref 0–0.5)
INR PPP: 3.4 (ref 0.9–1.1)
LYMPHOCYTES # BLD AUTO: 3.36 10*3/MM3 (ref 0.7–3.1)
LYMPHOCYTES NFR BLD AUTO: 27.4 % (ref 19.6–45.3)
MCH RBC QN AUTO: 29 PG (ref 26.6–33)
MCHC RBC AUTO-ENTMCNC: 33.4 G/DL (ref 31.5–35.7)
MCV RBC AUTO: 86.7 FL (ref 79–97)
MONOCYTES # BLD AUTO: 0.99 10*3/MM3 (ref 0.1–0.9)
MONOCYTES NFR BLD AUTO: 8.1 % (ref 5–12)
NEUTROPHILS NFR BLD AUTO: 58.5 % (ref 42.7–76)
NEUTROPHILS NFR BLD AUTO: 7.2 10*3/MM3 (ref 1.7–7)
NRBC BLD AUTO-RTO: 0 /100 WBC (ref 0–0.2)
PLATELET # BLD AUTO: 264 10*3/MM3 (ref 140–450)
PMV BLD AUTO: 9.3 FL (ref 6–12)
POTASSIUM SERPL-SCNC: 4.3 MMOL/L (ref 3.5–5.2)
PROT SERPL-MCNC: 7.9 G/DL (ref 6–8.5)
PROTHROMBIN TIME: 34.6 SECONDS (ref 12.3–15.1)
RBC # BLD AUTO: 5.04 10*6/MM3 (ref 4.14–5.8)
SODIUM SERPL-SCNC: 138 MMOL/L (ref 136–145)
WBC NRBC COR # BLD AUTO: 12.28 10*3/MM3 (ref 3.4–10.8)
WHOLE BLOOD HOLD COAG: NORMAL
WHOLE BLOOD HOLD SPECIMEN: NORMAL

## 2025-04-12 PROCEDURE — G0378 HOSPITAL OBSERVATION PER HR: HCPCS

## 2025-04-12 PROCEDURE — 70496 CT ANGIOGRAPHY HEAD: CPT

## 2025-04-12 PROCEDURE — 25510000001 IOPAMIDOL 61 % SOLUTION: Performed by: EMERGENCY MEDICINE

## 2025-04-12 PROCEDURE — 70450 CT HEAD/BRAIN W/O DYE: CPT

## 2025-04-12 PROCEDURE — 99222 1ST HOSP IP/OBS MODERATE 55: CPT | Performed by: STUDENT IN AN ORGANIZED HEALTH CARE EDUCATION/TRAINING PROGRAM

## 2025-04-12 PROCEDURE — 71045 X-RAY EXAM CHEST 1 VIEW: CPT

## 2025-04-12 PROCEDURE — 93005 ELECTROCARDIOGRAM TRACING: CPT | Performed by: NURSE PRACTITIONER

## 2025-04-12 PROCEDURE — 80053 COMPREHEN METABOLIC PANEL: CPT | Performed by: NURSE PRACTITIONER

## 2025-04-12 PROCEDURE — 99214 OFFICE O/P EST MOD 30 MIN: CPT | Performed by: STUDENT IN AN ORGANIZED HEALTH CARE EDUCATION/TRAINING PROGRAM

## 2025-04-12 PROCEDURE — 82948 REAGENT STRIP/BLOOD GLUCOSE: CPT

## 2025-04-12 PROCEDURE — 85025 COMPLETE CBC W/AUTO DIFF WBC: CPT | Performed by: NURSE PRACTITIONER

## 2025-04-12 PROCEDURE — 96374 THER/PROPH/DIAG INJ IV PUSH: CPT

## 2025-04-12 PROCEDURE — 85610 PROTHROMBIN TIME: CPT | Performed by: NURSE PRACTITIONER

## 2025-04-12 PROCEDURE — 0042T HC CT CEREBRAL PERFUSION W/WO CONTRAST: CPT

## 2025-04-12 PROCEDURE — 85730 THROMBOPLASTIN TIME PARTIAL: CPT | Performed by: STUDENT IN AN ORGANIZED HEALTH CARE EDUCATION/TRAINING PROGRAM

## 2025-04-12 PROCEDURE — 70498 CT ANGIOGRAPHY NECK: CPT

## 2025-04-12 PROCEDURE — 99285 EMERGENCY DEPT VISIT HI MDM: CPT | Performed by: EMERGENCY MEDICINE

## 2025-04-12 RX ORDER — BUPRENORPHINE AND NALOXONE 8; 2 MG/1; MG/1
1 FILM, SOLUBLE BUCCAL; SUBLINGUAL DAILY
Status: DISCONTINUED | OUTPATIENT
Start: 2025-04-12 | End: 2025-04-12

## 2025-04-12 RX ORDER — ATORVASTATIN CALCIUM 80 MG/1
80 TABLET, FILM COATED ORAL NIGHTLY
Status: DISCONTINUED | OUTPATIENT
Start: 2025-04-12 | End: 2025-04-13 | Stop reason: HOSPADM

## 2025-04-12 RX ORDER — ASPIRIN 81 MG/1
162 TABLET ORAL DAILY
Status: DISCONTINUED | OUTPATIENT
Start: 2025-04-12 | End: 2025-04-13

## 2025-04-12 RX ORDER — BUPRENORPHINE HYDROCHLORIDE AND NALOXONE HYDROCHLORIDE DIHYDRATE 8; 2 MG/1; MG/1
1 TABLET SUBLINGUAL ONCE
Status: DISCONTINUED | OUTPATIENT
Start: 2025-04-12 | End: 2025-04-12 | Stop reason: CLARIF

## 2025-04-12 RX ORDER — WARFARIN SODIUM 5 MG/1
5 TABLET ORAL 2 TIMES DAILY
Status: DISCONTINUED | OUTPATIENT
Start: 2025-04-12 | End: 2025-04-13 | Stop reason: HOSPADM

## 2025-04-12 RX ORDER — IOPAMIDOL 612 MG/ML
85 INJECTION, SOLUTION INTRAVASCULAR
Status: COMPLETED | OUTPATIENT
Start: 2025-04-12 | End: 2025-04-12

## 2025-04-12 RX ORDER — SODIUM CHLORIDE 0.9 % (FLUSH) 0.9 %
10 SYRINGE (ML) INJECTION AS NEEDED
Status: DISCONTINUED | OUTPATIENT
Start: 2025-04-12 | End: 2025-04-13 | Stop reason: HOSPADM

## 2025-04-12 RX ORDER — BUPRENORPHINE AND NALOXONE 8; 2 MG/1; MG/1
1 FILM, SOLUBLE BUCCAL; SUBLINGUAL ONCE
Status: COMPLETED | OUTPATIENT
Start: 2025-04-12 | End: 2025-04-12

## 2025-04-12 RX ORDER — BUPRENORPHINE HYDROCHLORIDE AND NALOXONE HYDROCHLORIDE DIHYDRATE 8; 2 MG/1; MG/1
1 TABLET SUBLINGUAL DAILY
Status: DISCONTINUED | OUTPATIENT
Start: 2025-04-12 | End: 2025-04-12 | Stop reason: CLARIF

## 2025-04-12 RX ORDER — PANTOPRAZOLE SODIUM 40 MG/10ML
40 INJECTION, POWDER, LYOPHILIZED, FOR SOLUTION INTRAVENOUS
Status: DISCONTINUED | OUTPATIENT
Start: 2025-04-12 | End: 2025-04-13 | Stop reason: HOSPADM

## 2025-04-12 RX ORDER — BUPRENORPHINE AND NALOXONE 8; 2 MG/1; MG/1
2 FILM, SOLUBLE BUCCAL; SUBLINGUAL DAILY
Status: DISCONTINUED | OUTPATIENT
Start: 2025-04-13 | End: 2025-04-13 | Stop reason: HOSPADM

## 2025-04-12 RX ORDER — IOPAMIDOL 612 MG/ML
100 INJECTION, SOLUTION INTRAVASCULAR
Status: COMPLETED | OUTPATIENT
Start: 2025-04-12 | End: 2025-04-12

## 2025-04-12 RX ADMIN — BUPRENORPHINE AND NALOXONE 1 FILM: 8; 2 FILM, SOLUBLE BUCCAL; SUBLINGUAL at 19:42

## 2025-04-12 RX ADMIN — BUPRENORPHINE AND NALOXONE 1 FILM: 8; 2 FILM, SOLUBLE BUCCAL; SUBLINGUAL at 19:58

## 2025-04-12 RX ADMIN — PANTOPRAZOLE SODIUM 40 MG: 40 INJECTION, POWDER, FOR SOLUTION INTRAVENOUS at 19:42

## 2025-04-12 RX ADMIN — ASPIRIN 162 MG: 81 TABLET, COATED ORAL at 21:14

## 2025-04-12 RX ADMIN — IOPAMIDOL 100 ML: 612 INJECTION, SOLUTION INTRAVENOUS at 17:46

## 2025-04-12 RX ADMIN — IOPAMIDOL 50 ML: 612 INJECTION, SOLUTION INTRAVENOUS at 17:33

## 2025-04-12 NOTE — H&P
[facility]   HISTORY AND PHYSICAL      Name:  Fercho Medellin   Age:  59 y.o.  Sex:  male  :  1965  MRN:  [id number]   Visit Number:  [id number]  Admission Date:  2025  Date Of Service:  25  Primary Care Physician:  Provider, No Known    Chief Complaint:     Facial numbness, mild headache     History Of Presenting Illness:      59 years old right-handed white male with known diagnosis of mechanical valve on Coumadin and aspirin, hypertension, mixed hyperlipidemia, type 2 diabetes, obesity, tobacco abuse presented to the hospital for evaluation of left facial droop symptoms started yesterday at 7 PM, denied other focal neurological deficit.  Patient stated that he had a prior history of Bell's palsy on the same side with symptoms comes and goes.        He endorses occipital headache on the left that has been gone since yesterday currently 5/10 intensity, he denied ear pain or rash, denied recent respiratory infection.  He smokes 2 pack/day for 30+ years, denied drinking or drug illicit.     Review Of Systems:    All systems were reviewed and negative except as mentioned in history of presenting illness, assessment and plan.    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    Past Surgical History: Patient  has a past surgical history that includes Back surgery; Appendectomy; Eye surgery; Interventional radiology procedure (Bilateral, 2016); and Cardiac valve replacement.    Social History: Patient  reports that he has been smoking cigarettes. He started smoking about 44 years ago. He has a 88.6 pack-year smoking history. He has never used smokeless  tobacco. He reports that he does not currently use alcohol. He reports current drug use. Drug: Marijuana.    Family History:  Patient's family history has been reviewed and found to be noncontributory.     Allergies:      Eye drops [naphazoline-polyethyl glycol]    Home Medications:    Prior to Admission Medications       Prescriptions Last Dose Informant Patient Reported? Taking?    albuterol (PROVENTIL HFA;VENTOLIN HFA) 108 (90 Base) MCG/ACT inhaler   No No    Two puffs per day every 4-6 hours as needed for wheeze or shortness of breath.    aspirin 81 MG EC tablet   Yes No    Take 81 mg by mouth Daily.    atorvastatin (LIPITOR) 40 MG tablet   Yes No    Take 1 tablet by mouth Daily.    carvedilol (COREG) 6.25 MG tablet   Yes No    Take 1 tablet by mouth 2 (Two) Times a Day With Meals.    furosemide (Lasix) 20 MG tablet   Yes No    Take 1 tablet by mouth 2 (Two) Times a Day.    glipizide (GLUCOTROL) 5 MG tablet   Yes No    Take 1 tablet by mouth 2 (Two) Times a Day Before Meals.    lisinopril (PRINIVIL,ZESTRIL) 20 MG tablet   Yes No    Take 1 tablet by mouth Daily.    Magnesium Oxide 400 (240 Mg) MG tablet   Yes No    Take 1 tablet by mouth Daily.    metFORMIN (GLUCOPHAGE) 1000 MG tablet   Yes No    Take 1,000 mg by mouth 2 (Two) Times a Day With Meals.    MethylPREDNISolone (MEDROL, JUAN ANTONIO,) 4 MG tablet   No No    Take as directed on package instructions.    warfarin (COUMADIN) 5 MG tablet   No No    2 tabs po daily          ED Medications:    Medications   sodium chloride 0.9 % flush 10 mL (has no administration in time range)   atorvastatin (LIPITOR) tablet 80 mg (has no administration in time range)   buprenorphine-naloxone (SUBOXONE) 8-2 MG film 1 film (has no administration in time range)   pantoprazole (PROTONIX) injection 40 mg (has no administration in time range)   iopamidol (ISOVUE-300) 61 % injection 85 mL (50 mL Intravenous Given 4/12/25 9494)   iopamidol (ISOVUE-300) 61 % injection 100 mL (100 mL  "Intravenous Given 4/12/25 3036)     Vital Signs:  Temp:  [97.7 °F (36.5 °C)] 97.7 °F (36.5 °C)  Heart Rate:  [70] 70  Resp:  [18] 18  BP: (159)/(81) 159/81        04/12/25  1701   Weight: 125 kg (275 lb)     Body mass index is 37.3 kg/m².    Physical Exam:     Most recent vital Signs: /81 (BP Location: Left arm, Patient Position: Sitting)   Pulse 70   Temp 97.7 °F (36.5 °C) (Oral)   Resp 18   Ht 182.9 cm (72\")   Wt 125 kg (275 lb)   SpO2 95%   BMI 37.30 kg/m²     Physical Exam    General: NAD, resting comfortably in bed.   HEENT: Normocephalic, atraumatic, PERRL. neck supple, No JVD.  No vesicles or lesions inside left ear  Cardiovascular: Regularly irregular rhythm. No murmurs, rubs, or gallops. Peripheral pulses 2+ bilaterally. No pedal edema,   Pulmonary: Lungs CTAB, No wheezes, rales, or rhonchi. No accessory muscle use, no resp. distress.   Abdomen: Soft, non-tender, non-distended. No guarding or rebound.   Extremities: No cyanosis, clubbing or edema. Warm and well-perfused. Capillary refill < 2 s   Skin: No rashes, lesions, or ulcers.   Neurological: Alert and oriented to person, place, and time. No focal deficits. Cranial nerves II-XII grossly intact. Motor strength 5/5 in all extremities. Sensation grossly intact to light touch.   Psychiatric: Calm, cooperative. Mood and affect appropriate for the situation.        Laboratory data:    I have reviewed the labs done in the emergency room.    Results from last 7 days   Lab Units 04/12/25  1717   SODIUM mmol/L 138   POTASSIUM mmol/L 4.3   CHLORIDE mmol/L 100   CO2 mmol/L 27.1   BUN mg/dL 8   CREATININE mg/dL 1.05   CALCIUM mg/dL 9.4   BILIRUBIN mg/dL 0.3   ALK PHOS U/L 78   ALT (SGPT) U/L 16   AST (SGOT) U/L 16   GLUCOSE mg/dL 123*     Results from last 7 days   Lab Units 04/12/25  1717   WBC 10*3/mm3 12.28*   HEMOGLOBIN g/dL 14.6   HEMATOCRIT % 43.7   PLATELETS 10*3/mm3 264     Results from last 7 days   Lab Units 04/12/25  1717   INR  3.40*       " "                        Invalid input(s): \"USDES\", \"NITRITITE\", \"BACT\", \"EP\"    Pain Management Panel           No data to display                EKG:      Sinus rhythm with PACs    Radiology:    CT CEREBRAL PERFUSION WITH & WITHOUT CONTRAST  Result Date: 4/12/2025  FINAL REPORT TECHNIQUE: null CLINICAL HISTORY: Neuro deficit, acute, stroke suspected , facial droop COMPARISON: null FINDINGS: CT Brain Perfusion: Comparison: Same day noncontrst CT Findings: CBF vol. < 30% is 0.0 ml. (Normal 28-65 ml/100gm/min. Front Neurol 2017; 8: 325). Mismatch volume, Tmax >6sec - CBF < 30% = 0.0 ml. Mismatch ratio: Tmax > 6 sec vol / CBF vol < 30% = 0.0. ML Movement maps: The movement maps do not show a lot of motion. No misregistration. AIF and VOF tracings: good curve, no jagged lines, good upstroke and decrease.     Impression: Normal brain perfusion. Authenticated and Electronically Signed by Stephan Brewster MD on 04/12/2025 06:24:12 PM    CT Angiogram Neck  Result Date: 4/12/2025  FINAL REPORT TECHNIQUE: null CLINICAL HISTORY: Stroke, , facial droop COMPARISON: null FINDINGS: CTA Neck, Bolus contrast injection, 3D reconstructions and MPRs: Comparison: None Findings: Soft tissues of the neck are unremarkable. Superior aorta and branch vessels are unremarkable. Right carotid: No stenosis (NASCET). Right vertebral: No stenosis. Left Carotid: No stenosis (NASCET). Left Vertebral: No stenosis.     Impression: No stenosis. No aneurysm or dissection. Authenticated and Electronically Signed by Stephan Brewster MD on 04/12/2025 06:05:00 PM    CT Angiogram Head w AI Analysis of LVO  Result Date: 4/12/2025  FINAL REPORT TECHNIQUE: null CLINICAL HISTORY: Neuro deficit, acute, stroke suspected , facial droop COMPARISON: null FINDINGS: CTA HEAD, bolus contrast injection. 3D reconstructions and MPRs:: Comparison: None Findings: Right Carotid Siphon: Calcified plaque with no stenosis. Right Anterior Cerebral Artery: A1 and A2 segments are " unremarkable. There is peripheral cortical enhancement. Right Middle Cerebral Artery: M1 and M2 segments are unremarkable. There is peripheral cortical enhancement. Right Posterior Cerebral Artery: There is right posterior cerebral persistent fetal circulation, a normal variant. P2 segments are unremarkable. There is peripheral enhancement. Left Carotid Siphon: Calcified plaque with mild stenosis. Left Anterior Cerebral Artery: A1 and A2 segments are unremarkable. There is peripheral cortical enhancement. Left Middle Cerebral Artery: M1 and M2 segments are unremarkable. There is peripheral cortical enhancement. Left Posterior Cerebral Artery: P1 and P2 segments are unremarkable. There is peripheral cortical enhancement. Basilar Artery: Normal. Venous drainage: Unremarkable.     Impression: No signs of aneurysm. No signs of arterial venous malformation. No hemodynamically significant stenosis. No large vessel occlusions. Authenticated and Electronically Signed by Stephan Brewster MD on 04/12/2025 06:02:04 PM    CT Head Without Contrast Stroke Protocol  Addendum Date: 4/12/2025  ADDENDUM REPORT ADDENDUM: This report was discussed with Lisa Mooney RN on Apr 12, 2025 17:58:00 EDT. Authenticated and Electronically Signed by Stephan Brewster MD on 04/12/2025 05:58:54 PM    Result Date: 4/12/2025  FINAL REPORT TECHNIQUE: null CLINICAL HISTORY: Stroke, facial droop COMPARISON: null FINDINGS: CT Head Without Contrast: Comparison: None Findings: Cortical sulci are prominent. There are bilateral remote lacunar infarcts involving the basal ganglia. No shift in midline structures. No intraparenchymal bleeding or abnormal extra axial blood fluid collections. Normal pituitary size. Clear paranasal sinuses. Unremarkable orbital structures. No depressed fractures.     Impression: Unremarkable CT of the head. ASPECTS score 10, NORMAL Authenticated and Electronically Signed by Stephan Brewster MD on 04/12/2025 05:53:38  PM      Assessment:    Stroke vs TIA vs  recurrent Bell's palsy  2. History of mechanical valves on Coumadin  -s/s left-sided facial numbness and mild occipital headache, history of stroke, last well-known approximately 12 hours ago  -Acute imaging CT scans grossly negative for any acute process.  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 pending bedside swallow             Khari Denise MD  04/12/25  19:32 EDT    Dictated utilizing Dragon dictation.

## 2025-04-12 NOTE — CONSULTS
[facility]   Teleneurology Note    Patient Name: Fercho Medellin  : 1965  MRN: [id number]  Primary Care Physician: Provider, No Known  Referring Site: [place]  Location of Neurologist: [place]    Subjective   Teleneurology Initial Data     Arrival Date Telestroke Site: 25 Arrival Time Telestroke Site: 1701   Neurologist Evaluation Date: 25 Neurologist Evaluation Time:    Date Last Known Well: 25 Time Last Known Well:      History     Chief complaint: Left facial droop    59 years old right-handed white male with known diagnosis of mechanical valve on Coumadin and aspirin, hypertension, mixed hyperlipidemia, type 2 diabetes, obesity, tobacco abuse presented to the hospital for evaluation of left facial droop symptoms started yesterday at 7 PM, denied other focal neurological deficit.  Patient stated that he had a prior history of Bell's palsy on the same side with symptoms comes and goes.  Currently on exam mainly the lower face affected but he stated that he was having difficulty closing his eye even though not significantly appreciated on exam.  He endorses occipital headache on the left that has been gone since yesterday currently 5/10 intensity, he denied ear pain or rash, denied recent respiratory infection.  He smokes 2 pack/day for 30+ years, denied drinking or drug illicit.    Stroke Risk Factors/ Pertinent Data     Stroke risk factors: diabetes, dyslipidemia, hypertension, obesity/ physical inactivity, prior stroke/ TIA, sleep apnea, smoking, other (specify)  Anticoagulants prior to arrival: warfarin (Coumadin)  Antiplatelets prior to arrival: aspirin  Statins prior to arrival: atorvastatin (Lipitor)     Scoring Scales     Modified [place] Scale  Pre-Stroke Modified [place] Scale: 0 - No Symptoms at all.  Intracerebral Hemmorhage (ICH) Score  [place] Coma Score: 13-15  Age>=80: no  [place] Coma Scale  Best Eye Response: Spontaneous  Best Verbal Response:  Oriented  Best Motor Response: Follows commands  [place] Coma Scale Score: 15    NIH Stroke Scale     NIHSS Performed Date: 04/12/25 NIHSS Performed Time: 1814   Interval: baseline  1a. Level of Consciousness: 0-->Alert, keenly responsive  1b. LOC Questions: 0-->Answers both questions correctly  1c. LOC Commands: 0-->Performs both tasks correctly  2. Best Gaze: 0-->Normal  3. Visual: 0-->No visual loss  4. Facial Palsy: 2-->Partial paralysis (total or near-total paralysis of lower face)  5a. Motor Arm, Left: 0-->No drift, limb holds 90 (or 45) degrees for full 10 secs  5b. Motor Arm, Right: 0-->No drift, limb holds 90 (or 45) degrees for full 10 secs  6a. Motor Leg, Left: 0-->No drift, leg holds 30 degree position for full 5 secs  6b. Motor Leg, Right: 0-->No drift, leg holds 30 degree position for full 5 secs  7. Limb Ataxia: 0-->Absent  8. Sensory: 0-->Normal, no sensory loss  9. Best Language: 0-->No aphasia, normal  10. Dysarthria: 0-->Normal  11. Extinction and Inattention (formerly Neglect): 0-->No abnormality  Total (NIH Stroke Scale): 2     Review of Systems     Review of Systems  -All reviewed and they were negative except was mentioned in HPI  Objective   Exam     Exam performed with the help of support staff from the referring site  Neurological Exam  General appearance: Well developed, well nourished, well groomed, alert and cooperative.      Higher integrative function: Oriented to time, place, person, intact recent and remote memory, attention span, concentration and language. Spontaneous speech, fund of vocabulary are normal.   CN II: Normal visual fields.   CN III IV VI: Extraocular movements are full without nystagmus. Pupils are equal, round, and reactive to light.   CN V: Normal facial sensation and strength of muscles of mastication.   CN VII: Left facial droop mostly on the lower face with slight involvement of the upper face, no weakness.   CN VIII: Auditory acuity is normal.   CN IX & X:  Symmetric palatal movement.   CN XI: Sternocleidomastoid and trapezius are normal. No weakness.   CN XII: The tongue is midline. No atrophy or fasciculations.   Motor: BUE and BLE 5/5. No fasciculations, rigidity, spasticity or abnormal movements.   Sensation: Normal to light touch and temperature throughout  Station and gait: Deferred.  Muscle stretch reflexes: Deferred  Coordination: Finger to nose test showed no dysmetria.     Result Review    Results          Personal review of CNS imaging:(Official report by radiologist pending)  Imaging  CT Imaging Review: CT Imaging reviewed, POSITIVE for abnormal finding  CTA Imaging Review: CTA Imaging reviewed, NO large vessel occlusion or severe stenosis seen  CT Perfusion Review: CT perfusion reviewed, NORMAL    -CT head personally reviewed and showing chronic bilateral basal ganglia ischemic strokes, left parietal encephalomalacia    Thrombolytic   Thrombolytics: thrombolytic not given  Thrombolytic Exclusion Criteria: Onset unknown or GREATER than 4.5 hours     Assessment & Plan   Assessment/ Plan     Assessment:  -Possible acute ischemic stroke giving the multiple stroke risk factors versus recurrence of left Bell's palsy.  -History of mechanical valve on Coumadin  -Essential hypertension  -Mixed hyperlipidemia  -Obesity  -Obstructive sleep apnea not compliant with the CPAP machine  -Tobacco abuse      Plan:  -Get MRI brain with and without contrast  -2D echo  -Stroke labs and order set  -His last known well since yesterday at 7 PM, I do not see an obvious acute ischemic stroke on the CT head.  Okay to continue his anticoagulation and aspirin due to high risk of cardioembolism given his history of mechanical valve.  -Counseled the patient regarding compliance with the CPAP machine, tobacco cessation, controlling other stroke risk factors he stated understanding.  -Discussed with the ER provider    This was an audio and video visit enabled telemedicine  encounter.    The consult was conducted in real time using interactive audio and video technology. Patient/Family was informed of the technology being used for this visit and agreed proceed. Patient located in hospital at [facility] and I'm the provider located at home/office based setting in [place], [place].      Disposition     Disposition: The patient will remain at the referring institution for further evaluation and management    Medical Decision Making  Medical Data Reviewed: Data reviewed including: clinical labs, radiology and/or medical tests, Obtaining/ reviewing old medical records, Obtaining case history from another source, Independent review of CNS images  Length of visit: 60 minutes    I, Ximena Joe MD, saw the patient on 04/12/25 at 1810 for an initial in-patient or emergency room telememedicine face to face consult using interactive technology for 60 minutes. The location of the patient was [place]. I was located at [place].    I have proceeded with this evaluation at the request of the referring practitioner as it is felt to be an emergency setting and no appropriate specialist is available to perform this evaluation. The originating hospital has reported that this is the correct patient and has obtained consent from the patient/surrogate to perform this telemedicine evaluation(including obtaining history, performing examination and reviewing data provided by the patient an/or originating site of care provider)    I have introduced myself to the patient, provided my credentials, disclosed my location, and determined that, based on review of the patient's chart and discussion with the patient's primary team, telemedicine via a HIPAA compliant, real-time, face-to-face two-way, interactive audio and video platform is an appropriate and effective means of providing the service.    The patient/surrogate has a right to refuse this evaluation as they have been explained risks including  potential loss of confidentiality, benefits, alternatives, and the potential need for subsequent face-to-face care. In this evaluation, we will be providing recommendations only.  The ultimate decision to follow or not to follow these recommendations will be left to the bedside treating/requesting practitioner.    The patient/surrogate has been notified that other healthcare professionals including technical person may be involved in this A/V evaluation.  All laws concerning confidentiality and patient access to medical records and copies of medical records apply to telemedicine.  The patient/surrogate has received the originating site's Health Notice of Privacy Practices.    Ximena Joe MD

## 2025-04-12 NOTE — ED PROVIDER NOTES
Pt Name: Fercho Medellin  MRN: [id number]  : 1965  Date of Encounter: 2025    PCP: Provider, No Known      Subjective    History of Present Illness:    Chief Complaint: Facial droop    History of Present Illness: Fercho Medellin is a 59 y.o. male who presents to the ER complaining of facial that started yesterday around 7 PM.  Noticed that patient's right side of face was drooping and he was having difficulty time drinking liquids.  Patient states he has a history of Bell's palsy.  Patient is complaining of occipital lobe headache that has progressively worsened over the interval..  Pain is described as Dull, Constant, and does not radiate  Patient rates pain as a 7 on a ten scale.    Triage Vitals:    ED Triage Vitals [25 1701]   Temp Heart Rate Resp BP SpO2   97.7 °F (36.5 °C) 70 18 159/81 95 %      Temp src Heart Rate Source Patient Position BP Location FiO2 (%)   Oral Monitor Sitting Left arm --       Nurses Notes reviewed and agree, including vitals, allergies, social history and prior medical history.     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     Migraine headache     Pectoralis muscle rupture     Tinnitus     Triceps reflex reduced     Unsp injury of musc/fasc/tend triceps, right arm, init        Past Surgical History:   Procedure Laterality Date    APPENDECTOMY      BACK SURGERY      Tail bone    CARDIAC VALVE REPLACEMENT      EYE SURGERY      cornea transplant; insertion of ocular implant    INTERVENTIONAL RADIOLOGY PROCEDURE Bilateral 2016    Procedure:  myelogram cervical with fluoroscopy to be preformed by  in the Cath lab - w/ a f/u CT;  Surgeon: Bryan Miller MD;  Location:  [facility];  Service:        Social History      Socioeconomic History    Marital status:    Tobacco Use    Smoking status: Every Day     Current packs/day: 2.00     Average packs/day: 2.0 packs/day for 44.3 years (88.6 ttl pk-yrs)     Types: Cigarettes     Start date: 1981    Smokeless tobacco: Never   Vaping Use    Vaping status: Never Used   Substance and Sexual Activity    Alcohol use: Not Currently     Comment: rare, maybe 1 every few months    Drug use: Yes     Types: Marijuana    Sexual activity: Defer     Partners: Female       Family History   Problem Relation Age of Onset    Arthritis Mother     COPD Father     Hypertension Father     Diabetes Father     Arthritis Other     Hyperlipidemia Other     Hypertension Other     Arthritis Sister     Hypertension Sister     Diabetes Maternal Grandmother     No Known Problems Maternal Grandfather     No Known Problems Paternal Grandmother     No Known Problems Paternal Grandfather     No Known Problems Sister     No Known Problems Sister     No Known Problems Sister        REVIEW OF SYSTEMS:     All systems reviewed and not pertinent unless noted.    Review of Systems   Neurological:  Positive for facial asymmetry and headaches.   All other systems reviewed and are negative.      Objective    Physical Exam  Vitals and nursing note reviewed.   Constitutional:       Appearance: Normal appearance.   HENT:      Head: Normocephalic and atraumatic.   Eyes:      General: Vision grossly intact.      Extraocular Movements: Extraocular movements intact.      Pupils: Pupils are equal, round, and reactive to light.   Cardiovascular:      Rate and Rhythm: Normal rate and regular rhythm.      Pulses: Normal pulses.      Heart sounds: Normal heart sounds.   Pulmonary:      Effort: Pulmonary effort is normal.      Breath sounds: Normal breath sounds.   Abdominal:      General: Bowel sounds are normal.      Palpations: Abdomen is soft.   Musculoskeletal:         General: Normal range of motion.      Cervical back:  Normal range of motion and neck supple. No pain with movement.   Skin:     General: Skin is warm and dry.      Capillary Refill: Capillary refill takes less than 2 seconds.   Neurological:      Mental Status: He is alert and oriented to person, place, and time.      GCS: GCS eye subscore is 4. GCS verbal subscore is 5. GCS motor subscore is 6.      Cranial Nerves: Facial asymmetry present.      Sensory: Sensation is intact.      Motor: Motor function is intact.   Psychiatric:         Attention and Perception: Attention and perception normal.         Mood and Affect: Mood and affect normal.         Speech: Speech normal.         Behavior: Behavior is cooperative.         Thought Content: Thought content normal.                         NIH Stroke Scale/Score (NIHSS) from [email or web address]  on 4/12/2025  ** All calculations should be rechecked by clinician prior to use **    RESULT SUMMARY:  1 points  NIH Stroke Scale      INPUTS:  1A: Level of consciousness --> 0 = Alert; keenly responsive  1B: Ask month and age --> 0 = Both questions right  1C: 'Blink eyes' & 'squeeze hands' --> 0 = Performs both tasks  2: Horizontal extraocular movements --> 0 = Normal  3: Visual fields --> 0 = No visual loss  4: Facial palsy --> 1 = Minor paralysis (flat nasolabial fold, smile asymmetry)  5A: Left arm motor drift --> 0 = No drift for 10 seconds  5B: Right arm motor drift --> 0 = No drift for 10 seconds  6A: Left leg motor drift --> 0 = No drift for 5 seconds  6B: Right leg motor drift --> 0 = No drift for 5 seconds  7: Limb Ataxia --> 0 = No ataxia  8: Sensation --> 0 = Normal; no sensory loss  9: Language/aphasia --> 0 = Normal; no aphasia  10: Dysarthria --> 0 = Normal  11: Extinction/inattention --> 0 = No abnormality    Procedures    ED Course:    ECG 12 Lead ED Triage Standing Order; Stroke (Onset >12 hrs)   Final Result          ED Course as of 04/12/25 1847   Sat Apr 12, 2025   1724   EKG Interpretation    Evaluated and interpreted  by emergency department physician    Rhythm: Normal Sinus Rhythm  Rate: Normal  Axis: Emi  Ectopy: Single single PAC  Conduction: Incomplete right bundle branch block  ST Segments: Normal  T Waves: Nonspecific T wave inversions in aVL  Q Waves: None    Clinical Impression: Normal Sinus Rhythm incomplete right bundle branch block with PAC    Jens Tejeda DO   [CR]   1815 Teleneurology has evaluated patient and feels that he is at high risk for additional strokes from his previous ones.  Would like to keep the patient in the hospital and have MRI performed in the a.m. [KH]   1845 CT Angiogram Neck [TG]      ED Course User Index  [CR] Jens Tejeda DO  [KH] Wilian Pacheco APRN  [TG] Cachorro Decker PA-C       Orders placed during this visit:    Orders Placed This Encounter   Procedures    CT Head Without Contrast Stroke Protocol    XR Chest 1 View    CT Angiogram Head w AI Analysis of LVO    CT Angiogram Neck    CT CEREBRAL PERFUSION WITH & WITHOUT CONTRAST    MRI Brain With & Without Contrast    Comprehensive Metabolic Panel    Protime-INR    Ewing Draw    CBC Auto Differential    NPO Diet NPO Type: Strict NPO    Undress and Gown    Continuous Pulse Oximetry    Vital Signs    Perform NIH Stroke Scale    Nursing Dysphagia Screening (Complete Prior to Giving anything PO)    RN to Place Order SLP Consult (IF swallow screen failed) - Eval & Treat Choosing Reason of RN Dysphagia Screen Failed    Inpatient Neurology Consult Stroke    Oxygen Therapy- Nasal Cannula; Titrate 1-6 LPM Per SpO2; 90 - 95%    POC Glucose Once    POC Glucose Once    ECG 12 Lead ED Triage Standing Order; Stroke (Onset >12 hrs)    Adult Transthoracic Echo Complete W/ Cont if Necessary Per Protocol    Insert Peripheral IV    CBC & Differential    Green Top (Gel)    Lavender Top    Gold Top - SST    Light Blue Top       LAB Results:    Lab Results (last 24 hours)       Procedure Component Value Units Date/Time    CBC &  Differential [[id number]]  (Abnormal) Collected: 04/12/25 1717    Specimen: Blood Updated: 04/12/25 1723    Narrative:      The following orders were created for panel order CBC & Differential.  Procedure                               Abnormality         Status                     ---------                               -----------         ------                     CBC Auto Differential[[id number]]        Abnormal            Final result                 Please view results for these tests on the individual orders.    Comprehensive Metabolic Panel [[id number]]  (Abnormal) Collected: 04/12/25 1717    Specimen: Blood Updated: 04/12/25 1746     Glucose 123 mg/dL      BUN 8 mg/dL      Creatinine 1.05 mg/dL      Sodium 138 mmol/L      Potassium 4.3 mmol/L      Chloride 100 mmol/L      CO2 27.1 mmol/L      Calcium 9.4 mg/dL      Total Protein 7.9 g/dL      Albumin 4.4 g/dL      ALT (SGPT) 16 U/L      AST (SGOT) 16 U/L      Alkaline Phosphatase 78 U/L      Total Bilirubin 0.3 mg/dL      Globulin 3.5 gm/dL      A/G Ratio 1.3 g/dL      BUN/Creatinine Ratio 7.6     Anion Gap 10.9 mmol/L      eGFR 82.3 mL/min/1.73     Narrative:      GFR Categories in Chronic Kidney Disease (CKD)      GFR Category          GFR (mL/min/1.73)    Interpretation  G1                     90 or greater         Normal or high (1)  G2                      60-89                Mild decrease (1)  G3a                   45-59                Mild to moderate decrease  G3b                   30-44                Moderate to severe decrease  G4                    15-29                Severe decrease  G5                    14 or less           Kidney failure          (1)In the absence of evidence of kidney disease, neither GFR category G1 or G2 fulfill the criteria for CKD.    eGFR calculation 2021 CKD-EPI creatinine equation, which does not include race as a factor    Protime-INR [[id number]]  (Abnormal) Collected: 04/12/25 1717    Specimen: Blood Updated:  04/12/25 1738     Protime 34.6 Seconds      INR 3.40    Narrative:      Suggested INR therapeutic range for stable oral anticoagulant therapy:    Low Intensity therapy:   1.5-2.0  Moderate Intensity therapy:   2.0-3.0  High Intensity therapy:   2.5-4.0    CBC Auto Differential [[id number]]  (Abnormal) Collected: 04/12/25 1717    Specimen: Blood Updated: 04/12/25 1723     WBC 12.28 10*3/mm3      RBC 5.04 10*6/mm3      Hemoglobin 14.6 g/dL      Hematocrit 43.7 %      MCV 86.7 fL      MCH 29.0 pg      MCHC 33.4 g/dL      RDW 13.5 %      RDW-SD 42.7 fl      MPV 9.3 fL      Platelets 264 10*3/mm3      Neutrophil % 58.5 %      Lymphocyte % 27.4 %      Monocyte % 8.1 %      Eosinophil % 4.6 %      Basophil % 0.9 %      Immature Grans % 0.5 %      Neutrophils, Absolute 7.20 10*3/mm3      Lymphocytes, Absolute 3.36 10*3/mm3      Monocytes, Absolute 0.99 10*3/mm3      Eosinophils, Absolute 0.56 10*3/mm3      Basophils, Absolute 0.11 10*3/mm3      Immature Grans, Absolute 0.06 10*3/mm3      nRBC 0.0 /100 WBC     POC Glucose Once [[id number]]  (Normal) Collected: 04/12/25 1747    Specimen: Blood Updated: 04/12/25 1750     Glucose 108 mg/dL      Comment: Serial Number: [id number]:  [id number]                If labs were ordered, I have independently reviewed the results and considered them in the diagnosis and treatment plan for the patient    RADIOLOGY    CT CEREBRAL PERFUSION WITH & WITHOUT CONTRAST  Result Date: 4/12/2025  FINAL REPORT TECHNIQUE: null CLINICAL HISTORY: Neuro deficit, acute, stroke suspected , facial droop COMPARISON: null FINDINGS: CT Brain Perfusion: Comparison: Same day noncontrst CT Findings: CBF vol. < 30% is 0.0 ml. (Normal 28-65 ml/100gm/min. Front Neurol 2017; 8: 325). Mismatch volume, Tmax >6sec - CBF < 30% = 0.0 ml. Mismatch ratio: Tmax > 6 sec vol / CBF vol < 30% = 0.0. ML Movement maps: The movement maps do not show a lot of motion. No misregistration. AIF and VOF tracings: good curve, no jagged  lines, good upstroke and decrease.     Impression: Impression: Normal brain perfusion. Authenticated and Electronically Signed by Stephan Brewster MD on 04/12/2025 06:24:12 PM    CT Angiogram Neck  Result Date: 4/12/2025  FINAL REPORT TECHNIQUE: null CLINICAL HISTORY: Stroke, , facial droop COMPARISON: null FINDINGS: CTA Neck, Bolus contrast injection, 3D reconstructions and MPRs: Comparison: None Findings: Soft tissues of the neck are unremarkable. Superior aorta and branch vessels are unremarkable. Right carotid: No stenosis (NASCET). Right vertebral: No stenosis. Left Carotid: No stenosis (NASCET). Left Vertebral: No stenosis.     Impression: Impression: No stenosis. No aneurysm or dissection. Authenticated and Electronically Signed by Stephan Brewster MD on 04/12/2025 06:05:00 PM    CT Angiogram Head w AI Analysis of LVO  Result Date: 4/12/2025  FINAL REPORT TECHNIQUE: null CLINICAL HISTORY: Neuro deficit, acute, stroke suspected , facial droop COMPARISON: null FINDINGS: CTA HEAD, bolus contrast injection. 3D reconstructions and MPRs:: Comparison: None Findings: Right Carotid Siphon: Calcified plaque with no stenosis. Right Anterior Cerebral Artery: A1 and A2 segments are unremarkable. There is peripheral cortical enhancement. Right Middle Cerebral Artery: M1 and M2 segments are unremarkable. There is peripheral cortical enhancement. Right Posterior Cerebral Artery: There is right posterior cerebral persistent fetal circulation, a normal variant. P2 segments are unremarkable. There is peripheral enhancement. Left Carotid Siphon: Calcified plaque with mild stenosis. Left Anterior Cerebral Artery: A1 and A2 segments are unremarkable. There is peripheral cortical enhancement. Left Middle Cerebral Artery: M1 and M2 segments are unremarkable. There is peripheral cortical enhancement. Left Posterior Cerebral Artery: P1 and P2 segments are unremarkable. There is peripheral cortical enhancement. Basilar Artery: Normal.  Venous drainage: Unremarkable.     Impression: Impression: No signs of aneurysm. No signs of arterial venous malformation. No hemodynamically significant stenosis. No large vessel occlusions. Authenticated and Electronically Signed by Stephan Brewster MD on 04/12/2025 06:02:04 PM    CT Head Without Contrast Stroke Protocol  Addendum Date: 4/12/2025  ADDENDUM REPORT ADDENDUM: This report was discussed with Lisa Mooney RN on Apr 12, 2025 17:58:00 EDT. Authenticated and Electronically Signed by Stephan Brewster MD on 04/12/2025 05:58:54 PM    Result Date: 4/12/2025  FINAL REPORT TECHNIQUE: null CLINICAL HISTORY: Stroke, facial droop COMPARISON: null FINDINGS: CT Head Without Contrast: Comparison: None Findings: Cortical sulci are prominent. There are bilateral remote lacunar infarcts involving the basal ganglia. No shift in midline structures. No intraparenchymal bleeding or abnormal extra axial blood fluid collections. Normal pituitary size. Clear paranasal sinuses. Unremarkable orbital structures. No depressed fractures.     Impression: Impression: Unremarkable CT of the head. ASPECTS score 10, NORMAL Authenticated and Electronically Signed by Stephan Brewster MD on 04/12/2025 05:53:38 PM       If I have ordered, I have independently reviewed the above noted radiographic studies.  Please see the radiologist dictation for the official interpretation    Medications given to patient in the ER    Medications   sodium chloride 0.9 % flush 10 mL (has no administration in time range)   iopamidol (ISOVUE-300) 61 % injection 85 mL (50 mL Intravenous Given 4/12/25 1733)   iopamidol (ISOVUE-300) 61 % injection 100 mL (100 mL Intravenous Given 4/12/25 1746)       AS OF 18:39 EDT VITALS:    BP - 159/81  HR - 70  TEMP - 97.7 °F (36.5 °C) (Oral)  O2 SATS - 95%         Shared Decision Making: After my consideration of the clinical presentation and laboratory/radiology studies obtained, I have discussed the findings with the  patient/patient representative who is in agreement with the treatment plan and final disposition. Risks and benefits of discharge and/or observation admission were discussed.  Final disposition of the patient will be admitted to the hospital.  Patient is requested to follow-up with primary care provider and specialist in 1 week following final discharge.      Medical Decision Making  Fercho Medellin is a 59 y.o. male who presents to the ER complaining of facial that started yesterday around 7 PM.  Noticed that patient's right side of face was drooping and he was having difficulty time drinking liquids.  Patient states he has a history of Bell's palsy.  Patient is complaining of occipital lobe headache that has progressively worsened over the interval..  Pain is described as Dull, Constant, and does not radiate  Patient rates pain as a 7 on a ten scale.    Patient was evaluated and initiated a stroke protocol.  Patient had labs drawn and then emergent CT scan and angiography of neck and head was performed.  Once patient was returned from CT scan teleneurology was consulted and has evaluated the patient.  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  Neurology is requesting patient be admitted to the hospitalist for MRI in the morning for evaluation and have close monitoring over the next 24 hours.  Patient had labs drawn which does show elevated white blood cell count of 12.2, elevated INR of 3.4.  After discussion with patient about elevated INR he states he has reduced his Coumadin last week for his mechanical valves.  Patient states Coumadin was over 4 last week.    After discussion with hospitalist he has agreed to admit patient for observation for the facial droop have an MRI performed in the morning.    DDX: includes but is not limited to: Stroke, Bell's palsy, facial drooping, other      Problems Addressed:  Facial droop: complicated acute illness or injury    Amount and/or  Complexity of Data Reviewed  External Data Reviewed: labs, radiology, ECG and notes.     Details: I have personally reviewed labs, radiology EKG and notes from patient's chart  Labs: ordered. Decision-making details documented in ED Course.     Details: I have personally reviewed and documented all results  Radiology: ordered.     Details: I have personally reviewed and documented all results  ECG/medicine tests: ordered.     Details: I have personally reviewed and documented all results  Discussion of management or test interpretation with external provider(s): Discussed assessment, treatment and plan with ER attending, teleneurology, hospitalist    Risk  Prescription drug management.  Risk Details: I have discussed with patient the finding of the test preformed today. Patient has been diagnosed with facial droop and will be admitted to the hospital.             Final diagnoses:   Facial droop       Please note that portions of this document were completed using voice recognition dictation software.       Wilian Pacheco, SHERWIN  04/12/25 1831       Wilian Pacheco, SHERWIN  04/12/25 4817

## 2025-04-13 ENCOUNTER — APPOINTMENT (OUTPATIENT)
Dept: MRI IMAGING | Facility: HOSPITAL | Age: 60
End: 2025-04-13
Payer: MEDICAID

## 2025-04-13 ENCOUNTER — APPOINTMENT (OUTPATIENT)
Dept: CARDIOLOGY | Facility: HOSPITAL | Age: 60
End: 2025-04-13
Payer: MEDICAID

## 2025-04-13 ENCOUNTER — READMISSION MANAGEMENT (OUTPATIENT)
Dept: CALL CENTER | Facility: HOSPITAL | Age: 60
End: 2025-04-13
Payer: MEDICAID

## 2025-04-13 VITALS
HEART RATE: 58 BPM | RESPIRATION RATE: 16 BRPM | BODY MASS INDEX: 36.73 KG/M2 | OXYGEN SATURATION: 95 % | SYSTOLIC BLOOD PRESSURE: 134 MMHG | DIASTOLIC BLOOD PRESSURE: 69 MMHG | WEIGHT: 271.17 LBS | HEIGHT: 72 IN | TEMPERATURE: 98.3 F

## 2025-04-13 LAB
AORTIC DIMENSIONLESS INDEX: 0.78 (DI)
APTT PPP: 45.6 SECONDS (ref 23–35)
AV MEAN PRESS GRAD SYS DOP V1V2: 3 MMHG
AV VMAX SYS DOP: 156 CM/SEC
BH CV ECHO MEAS - AO MAX PG: 9.7 MMHG
BH CV ECHO MEAS - AO ROOT DIAM: 4 CM
BH CV ECHO MEAS - AO V2 VTI: 28.1 CM
BH CV ECHO MEAS - AVA(I,D): 2.7 CM2
BH CV ECHO MEAS - EDV(CUBED): 154 ML
BH CV ECHO MEAS - EDV(MOD-SP2): 117 ML
BH CV ECHO MEAS - EDV(MOD-SP4): 163 ML
BH CV ECHO MEAS - EF(MOD-SP2): 40.9 %
BH CV ECHO MEAS - EF(MOD-SP4): 60.1 %
BH CV ECHO MEAS - ESV(CUBED): 47 ML
BH CV ECHO MEAS - ESV(MOD-SP2): 69.1 ML
BH CV ECHO MEAS - ESV(MOD-SP4): 65.1 ML
BH CV ECHO MEAS - FS: 32.6 %
BH CV ECHO MEAS - IVS/LVPW: 1 CM
BH CV ECHO MEAS - IVSD: 0.99 CM
BH CV ECHO MEAS - LA DIMENSION: 4.7 CM
BH CV ECHO MEAS - LAT PEAK E' VEL: 9 CM/SEC
BH CV ECHO MEAS - LV DIASTOLIC VOL/BSA (35-75): 67.2 CM2
BH CV ECHO MEAS - LV MASS(C)D: 201.5 GRAMS
BH CV ECHO MEAS - LV MAX PG: 4.5 MMHG
BH CV ECHO MEAS - LV MEAN PG: 2 MMHG
BH CV ECHO MEAS - LV SYSTOLIC VOL/BSA (12-30): 26.9 CM2
BH CV ECHO MEAS - LV V1 MAX: 106 CM/SEC
BH CV ECHO MEAS - LV V1 VTI: 22 CM
BH CV ECHO MEAS - LVIDD: 5.4 CM
BH CV ECHO MEAS - LVIDS: 3.6 CM
BH CV ECHO MEAS - LVOT AREA: 3.5 CM2
BH CV ECHO MEAS - LVOT DIAM: 2.1 CM
BH CV ECHO MEAS - LVPWD: 0.99 CM
BH CV ECHO MEAS - MED PEAK E' VEL: 7.2 CM/SEC
BH CV ECHO MEAS - MV A MAX VEL: 103 CM/SEC
BH CV ECHO MEAS - MV DEC SLOPE: 352 CM/SEC2
BH CV ECHO MEAS - MV DEC TIME: 0.46 SEC
BH CV ECHO MEAS - MV E MAX VEL: 163 CM/SEC
BH CV ECHO MEAS - MV E/A: 1.58
BH CV ECHO MEAS - MV MAX PG: 9.4 MMHG
BH CV ECHO MEAS - MV MEAN PG: 3 MMHG
BH CV ECHO MEAS - MV V2 VTI: 42.6 CM
BH CV ECHO MEAS - MVA(VTI): 1.79 CM2
BH CV ECHO MEAS - PA ACC TIME: 0.19 SEC
BH CV ECHO MEAS - PA V2 MAX: 161 CM/SEC
BH CV ECHO MEAS - RV MAX PG: 1.76 MMHG
BH CV ECHO MEAS - RV V1 MAX: 66.3 CM/SEC
BH CV ECHO MEAS - RV V1 VTI: 20.3 CM
BH CV ECHO MEAS - SV(LVOT): 76.2 ML
BH CV ECHO MEAS - SV(MOD-SP2): 47.9 ML
BH CV ECHO MEAS - SV(MOD-SP4): 97.9 ML
BH CV ECHO MEAS - SVI(LVOT): 31.4 ML/M2
BH CV ECHO MEAS - SVI(MOD-SP2): 19.8 ML/M2
BH CV ECHO MEAS - SVI(MOD-SP4): 40.4 ML/M2
BH CV ECHO MEAS - TAPSE (>1.6): 1.53 CM
BH CV ECHO MEAS - TR MAX PG: 34.1 MMHG
BH CV ECHO MEAS - TR MAX VEL: 292 CM/SEC
BH CV ECHO MEASUREMENTS AVERAGE E/E' RATIO: 20.12
BH CV ECHO SHUNT ASSESSMENT PERFORMED (HIDDEN SCRIPTING): 1
BH CV XLRA - RV BASE: 3.7 CM
BH CV XLRA - RV LENGTH: 10.3 CM
BH CV XLRA - RV MID: 4.6 CM
CHOLEST SERPL-MCNC: 114 MG/DL (ref 0–200)
DEPRECATED RDW RBC AUTO: 44.3 FL (ref 37–54)
ERYTHROCYTE [DISTWIDTH] IN BLOOD BY AUTOMATED COUNT: 13.7 % (ref 12.3–15.4)
GLUCOSE BLDC GLUCOMTR-MCNC: 135 MG/DL (ref 70–130)
GLUCOSE BLDC GLUCOMTR-MCNC: 207 MG/DL (ref 70–130)
HBA1C MFR BLD: 7.3 % (ref 4.8–5.6)
HCT VFR BLD AUTO: 44.3 % (ref 37.5–51)
HDLC SERPL-MCNC: 31 MG/DL (ref 40–60)
HGB BLD-MCNC: 14.6 G/DL (ref 13–17.7)
INR PPP: 3.28 (ref 0.9–1.1)
LDLC SERPL CALC-MCNC: 56 MG/DL (ref 0–100)
LDLC/HDLC SERPL: 1.68 {RATIO}
LEFT ATRIUM VOLUME INDEX: 31.2 ML/M2
LV EF BIPLANE MOD: 52.4 %
MCH RBC QN AUTO: 29.1 PG (ref 26.6–33)
MCHC RBC AUTO-ENTMCNC: 33 G/DL (ref 31.5–35.7)
MCV RBC AUTO: 88.4 FL (ref 79–97)
PLATELET # BLD AUTO: 238 10*3/MM3 (ref 140–450)
PMV BLD AUTO: 9.7 FL (ref 6–12)
PROTHROMBIN TIME: 33.7 SECONDS (ref 12.3–15.1)
RBC # BLD AUTO: 5.01 10*6/MM3 (ref 4.14–5.8)
TRIGL SERPL-MCNC: 154 MG/DL (ref 0–150)
VLDLC SERPL-MCNC: 27 MG/DL (ref 5–40)
WBC NRBC COR # BLD AUTO: 12.26 10*3/MM3 (ref 3.4–10.8)

## 2025-04-13 PROCEDURE — G0378 HOSPITAL OBSERVATION PER HR: HCPCS

## 2025-04-13 PROCEDURE — 82948 REAGENT STRIP/BLOOD GLUCOSE: CPT

## 2025-04-13 PROCEDURE — 85730 THROMBOPLASTIN TIME PARTIAL: CPT | Performed by: STUDENT IN AN ORGANIZED HEALTH CARE EDUCATION/TRAINING PROGRAM

## 2025-04-13 PROCEDURE — A9577 INJ MULTIHANCE: HCPCS | Performed by: INTERNAL MEDICINE

## 2025-04-13 PROCEDURE — 97161 PT EVAL LOW COMPLEX 20 MIN: CPT

## 2025-04-13 PROCEDURE — 70553 MRI BRAIN STEM W/O & W/DYE: CPT

## 2025-04-13 PROCEDURE — 99238 HOSP IP/OBS DSCHRG MGMT 30/<: CPT | Performed by: INTERNAL MEDICINE

## 2025-04-13 PROCEDURE — 93306 TTE W/DOPPLER COMPLETE: CPT

## 2025-04-13 PROCEDURE — 82948 REAGENT STRIP/BLOOD GLUCOSE: CPT | Performed by: STUDENT IN AN ORGANIZED HEALTH CARE EDUCATION/TRAINING PROGRAM

## 2025-04-13 PROCEDURE — 25510000002 GADOBENATE DIMEGLUMINE 529 MG/ML SOLUTION: Performed by: INTERNAL MEDICINE

## 2025-04-13 PROCEDURE — 85610 PROTHROMBIN TIME: CPT | Performed by: STUDENT IN AN ORGANIZED HEALTH CARE EDUCATION/TRAINING PROGRAM

## 2025-04-13 PROCEDURE — 83036 HEMOGLOBIN GLYCOSYLATED A1C: CPT | Performed by: STUDENT IN AN ORGANIZED HEALTH CARE EDUCATION/TRAINING PROGRAM

## 2025-04-13 PROCEDURE — 96376 TX/PRO/DX INJ SAME DRUG ADON: CPT

## 2025-04-13 PROCEDURE — 85027 COMPLETE CBC AUTOMATED: CPT | Performed by: STUDENT IN AN ORGANIZED HEALTH CARE EDUCATION/TRAINING PROGRAM

## 2025-04-13 PROCEDURE — 94799 UNLISTED PULMONARY SVC/PX: CPT

## 2025-04-13 PROCEDURE — 93306 TTE W/DOPPLER COMPLETE: CPT | Performed by: INTERNAL MEDICINE

## 2025-04-13 PROCEDURE — 80061 LIPID PANEL: CPT | Performed by: STUDENT IN AN ORGANIZED HEALTH CARE EDUCATION/TRAINING PROGRAM

## 2025-04-13 PROCEDURE — 99214 OFFICE O/P EST MOD 30 MIN: CPT | Performed by: PSYCHIATRY & NEUROLOGY

## 2025-04-13 PROCEDURE — 94660 CPAP INITIATION&MGMT: CPT

## 2025-04-13 RX ORDER — METHYLPREDNISOLONE 4 MG/1
24 TABLET ORAL ONCE
Status: DISCONTINUED | OUTPATIENT
Start: 2025-04-13 | End: 2025-04-13 | Stop reason: HOSPADM

## 2025-04-13 RX ORDER — METHYLPREDNISOLONE 4 MG/1
4 TABLET ORAL
Status: DISCONTINUED | OUTPATIENT
Start: 2025-04-16 | End: 2025-04-13 | Stop reason: HOSPADM

## 2025-04-13 RX ORDER — METHYLPREDNISOLONE 4 MG/1
4 TABLET ORAL
Status: DISCONTINUED | OUTPATIENT
Start: 2025-04-18 | End: 2025-04-13 | Stop reason: HOSPADM

## 2025-04-13 RX ORDER — METHYLPREDNISOLONE 4 MG/1
4 TABLET ORAL
Status: DISCONTINUED | OUTPATIENT
Start: 2025-04-17 | End: 2025-04-13 | Stop reason: HOSPADM

## 2025-04-13 RX ORDER — METHYLPREDNISOLONE 4 MG/1
8 TABLET ORAL
Status: DISCONTINUED | OUTPATIENT
Start: 2025-04-14 | End: 2025-04-13 | Stop reason: HOSPADM

## 2025-04-13 RX ORDER — NICOTINE 21 MG/24HR
1 PATCH, TRANSDERMAL 24 HOURS TRANSDERMAL
Status: DISCONTINUED | OUTPATIENT
Start: 2025-04-13 | End: 2025-04-13 | Stop reason: HOSPADM

## 2025-04-13 RX ORDER — METHYLPREDNISOLONE 4 MG/1
4 TABLET ORAL
Status: DISCONTINUED | OUTPATIENT
Start: 2025-04-15 | End: 2025-04-13 | Stop reason: HOSPADM

## 2025-04-13 RX ORDER — METHYLPREDNISOLONE 4 MG/1
4 TABLET ORAL
Status: DISCONTINUED | OUTPATIENT
Start: 2025-04-14 | End: 2025-04-13 | Stop reason: HOSPADM

## 2025-04-13 RX ORDER — METHYLPREDNISOLONE 4 MG/1
TABLET ORAL
Qty: 21 TABLET | Refills: 0 | Status: SHIPPED | OUTPATIENT
Start: 2025-04-13

## 2025-04-13 RX ADMIN — NICOTINE 1 PATCH: 21 PATCH TRANSDERMAL at 13:40

## 2025-04-13 RX ADMIN — ASPIRIN 162 MG: 81 TABLET, COATED ORAL at 09:23

## 2025-04-13 RX ADMIN — GADOBENATE DIMEGLUMINE 15 ML: 529 INJECTION, SOLUTION INTRAVENOUS at 09:16

## 2025-04-13 RX ADMIN — PANTOPRAZOLE SODIUM 40 MG: 40 INJECTION, POWDER, FOR SOLUTION INTRAVENOUS at 06:25

## 2025-04-13 RX ADMIN — WARFARIN SODIUM 5 MG: 5 TABLET ORAL at 09:23

## 2025-04-13 RX ADMIN — BUPRENORPHINE AND NALOXONE 2 FILM: 8; 2 FILM, SOLUBLE BUCCAL; SUBLINGUAL at 09:22

## 2025-04-13 RX ADMIN — Medication 10 ML: at 09:23

## 2025-04-13 NOTE — PLAN OF CARE
Problem: Noninvasive Ventilation Acute  Goal: Effective Unassisted Ventilation and Oxygenation  Outcome: Progressing   Goal Outcome Evaluation:                    RT EQUIPMENT DEVICE RELATED - SKIN ASSESSMENT    Stefan Score:  Stefan Score: 22     RT Medical Equipment/Device:     NIV Mask:  Under-the-nose   size:  b    Skin Assessment:       :       Device Skin Pressure Protection:   none    Nurse Notification:  No    Laurie Mooney, CRT

## 2025-04-13 NOTE — THERAPY DISCHARGE NOTE
Patient Name: Fercho Medellin  : 1965    MRN: [id number]                              Today's Date: 2025       Admit Date: 2025    Visit Dx:     ICD-10-CM ICD-9-CM   1. 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    Lumbar radiculopathy    Migraine    Mild cognitive disorder    Nocturia    Tinnitus    Triceps reflex reduced    Urge incontinence of urine    Weakness of hand    Tobacco abuse    Focal muscle atrophy    Low HDL (under 40)    Prediabetes    Arthritis    Closed fracture of distal phalanx of digit of hand    Ischemic stroke     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     Migraine headache     Pectoralis muscle rupture     Tinnitus     Triceps reflex reduced     Unsp injury of musc/fasc/tend triceps, right arm, init      Past Surgical History:   Procedure Laterality Date    APPENDECTOMY      BACK SURGERY      Tail bone    CARDIAC VALVE REPLACEMENT      EYE SURGERY      cornea transplant; insertion of ocular implant    INTERVENTIONAL RADIOLOGY PROCEDURE Bilateral 2016    Procedure:  myelogram cervical with fluoroscopy to be preformed by  in the Cath lab - w/ a f/u CT;  Surgeon: Bryan Miller MD;  Location: [facility] INVASIVE LOCATION;  Service:       General Information       Row Name 25 1202          Physical Therapy  Time and Intention    Document Type discharge evaluation/summary  -TW     Mode of Treatment individual therapy;physical therapy  -TW       Row Name 04/13/25 1202          General Information    Patient Profile Reviewed yes  -TW     Prior Level of Function independent:  pt did not use any assistive device prior to admission. Pt has tub/shower with grab bars and stands for shower.  -TW     Existing Precautions/Restrictions cardiac  -TW     Barriers to Rehab impaired sensation  -TW       Row Name 04/13/25 1202          Living Environment    Current Living Arrangements home  -TW     People in Home spouse  -TW       Row Name 04/13/25 1202          Home Main Entrance    Number of Stairs, Main Entrance none  -TW       Row Name 04/13/25 1202          Stairs Within Home, Primary    Number of Stairs, Within Home, Primary none  -TW       Row Name 04/13/25 1202          Cognition    Orientation Status (Cognition) oriented x 4  -TW       Row Name 04/13/25 1202          Safety Issues/Impairments Affecting Functional Mobility    Impairments Affecting Function (Mobility) sensation/sensory awareness;pain;range of motion (ROM)  -TW               User Key  (r) = Recorded By, (t) = Taken By, (c) = Cosigned By      Initials Name Provider Type    TW Erica Palacio, PT Physical Therapist                   Mobility       Row Name 04/13/25 1202          Bed Mobility    Bed Mobility bed mobility (all) activities  -TW     All Activities, [place] (Bed Mobility) independent  -TW       Row Name 04/13/25 1202          Bed-Chair Transfer    Bed-Chair [place] (Transfers) independent  -TW     Assistive Device (Bed-Chair Transfers) other (see comments)  -TW     Comment, (Bed-Chair Transfer) pt does not need an assistive device for mobility. Gait belt was utiliized during evaluation.  -TW       Row Name 04/13/25 1202          Sit-Stand Transfer    Sit-Stand [place] (Transfers) independent  -TW     Assistive Device (Sit-Stand Transfers)  other (see comments)  -TW     Comment, (Sit-Stand Transfer) pt does not need an assistive device for mobility. Gait belt was utiliized during evaluation.  -       Row Name 04/13/25 1202          Gait/Stairs (Locomotion)    [place] Level (Gait) independent;other (see comments)  -TW     Patient was able to Ambulate yes  -TW     Distance in Feet (Gait) 250  -TW     Comment, (Gait/Stairs) pt has a toe out gait. Per pt this is his normal gait pattern. . Gait belt was utiliized during evaluation but pt was independent with gait.  -TW               User Key  (r) = Recorded By, (t) = Taken By, (c) = Cosigned By      Initials Name Provider Type    TW Erica Palacio, PT Physical Therapist                   Obj/Interventions       Row Name 04/13/25 1202          Range of Motion Comprehensive    Comment, General Range of Motion R shoulder abd and internal rotation limited and painful. Per pt this has been this way for awhile due to a previous injury. No BLE ROM deficits noted. Pt did demonstrate a L sided facial drop as well as a delay in closing his L eyelid.  -       Row Name 04/13/25 1202          Strength Comprehensive (MMT)    Comment, General Manual Muscle Testing (MMT) Assessment BLE strength was fuound to be equal when compared side to side and grossly 4+/5 to 5-/5  -[facility] Name 04/13/25 1202          Advanced Stepping/Walking Interventions    Stepping/Walking Interventions backward walking;side stepping  -TW     Backward Walking (Stepping/Walking Interventions) 5 steps independently  -TW     Side Stepping (Stepping/Walking Interventions) 5 steps to either side independently. Pt does have a toe out static stance and gait pattern.  -       Row Name 04/13/25 1202          Balance    Balance Assessment sitting static balance;sitting dynamic balance;standing static balance;standing dynamic balance  -TW     Static Sitting Balance independent  -TW     Dynamic Sitting Balance independent  -TW     Position,  "Sitting Balance unsupported;sitting edge of bed  -TW     Static Standing Balance independent  -TW     Dynamic Standing Balance independent  -TW     Position/Device Used, Standing Balance unsupported  -TW       Row Name 04/13/25 1202          Sensory Assessment (Somatosensory)    Sensory Assessment (Somatosensory) other (see comments)  pt has L sided facial \"tingling\" but to light touch, pt found B sensation to be the same.  -TW               User Key  (r) = Recorded By, (t) = Taken By, (c) = Cosigned By      Initials Name Provider Type    TW Erica Palacio PT Physical Therapist                   Goals/Plan    No documentation.                  Clinical Impression       Row Name 04/13/25 1202          Pain    Pretreatment Pain Rating 2/10  -TW     Posttreatment Pain Rating 2/10  -TW     Pain Location back  -TW     Pain Side/Orientation bilateral;lower  -TW     Pain Management Interventions other (see comments)  -TW     Pre/Posttreatment Pain Comment pt also has R shoulder pain with internal rotation or abduction.  -TW       Row Name 04/13/25 1202          Plan of Care Review    Plan of Care Reviewed With patient  -TW     Outcome Evaluation PT evaluation completed this date with pt presenting supine in bed, O x 4, on room air, and having a stiffness in his lower back rated 2/10. Pt was noted to have L sided facial droop and a delay when closing L eyelid when compared to the R. Pt has a \"tingling\" feeling in L cheek but sensation to light touch intact. Pt was independent with bed mobility, transfers, and gait for 200 ft. Pt does have a toe out static standing position and the is also observed during gait. Pt's BLE strength grossly 4+/5 to 5/5 and pt was able to side step and backward step independently without any difficulty with balance. Pt does have R shoulder pain, which is chronic for him, with shoulder int rotation and with shoulder abduction. Pt had no change in his back pain with PT evaluation. No deficits were " identified that would required skilled intervention in the inpt setting. Discussed this with pt and pt verbalized agreement. Will d/c pt at this time.  -TW       Row Name 04/13/25 1202          Therapy Assessment/Plan (PT)    Patient/Family Therapy Goals Statement (PT) To return home.  -TW     Criteria for Skilled Interventions Met (PT) no;no problems identified which require skilled intervention  -TW       Row Name 04/13/25 1202          Vital Signs    O2 Delivery Pre Treatment room air  -TW     Pre Patient Position Supine  -TW     Intra Patient Position Standing  -TW     Post Patient Position Supine  -TW       Row Name 04/13/25 1202          Positioning and Restraints    Pre-Treatment Position in bed  -TW     Post Treatment Position bed  -TW     In Bed sitting EOB;call light within reach;notified nsg  -TW               User Key  (r) = Recorded By, (t) = Taken By, (c) = Cosigned By      Initials Name Provider Type    Erica Moreno, PT Physical Therapist                   Outcome Measures       Row Name 04/13/25 1202 04/13/25 0830       How much help from another person do you currently need...    Turning from your back to your side while in flat bed without using bedrails? 4  -TW 4  -MM    Moving from lying on back to sitting on the side of a flat bed without bedrails? 4  -TW 4  -MM    Moving to and from a bed to a chair (including a wheelchair)? 4  -TW 4  -MM    Standing up from a chair using your arms (e.g., wheelchair, bedside chair)? 4  -TW 4  -MM    Climbing 3-5 steps with a railing? 4  -TW 3  -MM    To walk in hospital room? 4  -TW 4  -MM    AM-PAC 6 Clicks Score (PT) 24  -TW 23  -MM    Highest Level of Mobility Goal 8 --> Walked 250 feet or more  -TW 7 --> Walk 25 feet or more  -MM      Row Name 04/13/25 1202          Functional Assessment    Outcome Measure Options AM-PAC 6 Clicks Basic Mobility (PT)  -TW               User Key  (r) = Recorded By, (t) = Taken By, (c) = Cosigned By      Initials Name  "Provider Type    MM Elodia Reyes RN Registered Nurse    Erica Moreno PT Physical Therapist                  Physical Therapy Education       Title: PT OT SLP Therapies (Done)       Topic: Physical Therapy (Done)       Point: Mobility training (Done)       Learning Progress Summary            Patient Acceptance, FEDERICO, VU by TW at 4/13/2025 1202    Comment: Pt education on purpose of PT evaluation and that no further PT treatment indicated.                                      User Key       Initials Effective Dates Name Provider Type Discipline     06/16/21 -  Erica Palacio PT Physical Therapist PT                  PT Recommendation and Plan     Outcome Evaluation: PT evaluation completed this date with pt presenting supine in bed, O x 4, on room air, and having a stiffness in his lower back rated 2/10. Pt was noted to have L sided facial droop and a delay when closing L eyelid when compared to the R. Pt has a \"tingling\" feeling in L cheek but sensation to light touch intact. Pt was independent with bed mobility, transfers, and gait for 200 ft. Pt does have a toe out static standing position and the is also observed during gait. Pt's BLE strength grossly 4+/5 to 5/5 and pt was able to side step and backward step independently without any difficulty with balance. Pt does have R shoulder pain, which is chronic for him, with shoulder int rotation and with shoulder abduction. Pt had no change in his back pain with PT evaluation. No deficits were identified that would required skilled intervention in the inpt setting. Discussed this with pt and pt verbalized agreement. Will d/c pt at this time.     Time Calculation:   PT Evaluation Complexity  History, PT Evaluation Complexity: 3 or more personal factors and/or comorbidities  Examination of Body Systems (PT Eval Complexity): total of 3 or more elements  Clinical Presentation (PT Evaluation Complexity): stable  Clinical Decision Making (PT Evaluation Complexity): " low complexity  Overall Complexity (PT Evaluation Complexity): low complexity     PT Charges       Row Name 04/13/25 1202             Time Calculation    Stop Time 1202  -TW      PT Received On 04/13/25 -TW                User Key  (r) = Recorded By, (t) = Taken By, (c) = Cosigned By      Initials Name Provider Type    Erica Moreno, CITLALY Physical Therapist                  Therapy Charges for Today       Code Description Service Date Service Provider Modifiers Qty    [id number] HC PT EVAL LOW COMPLEXITY 3 4/13/2025 Erica Palacio PT GP 1            PT G-Codes  Outcome Measure Options: AM-PAC 6 Clicks Basic Mobility (PT)  AM-PAC 6 Clicks Score (PT): 24    PT Discharge Summary  Anticipated Discharge Disposition (PT): home  Reason for Discharge: At baseline function  Discharge Destination: Home    Erica Palacio PT  4/13/2025

## 2025-04-13 NOTE — PLAN OF CARE
Goal Outcome Evaluation:            Interventions implemented as appropriate

## 2025-04-13 NOTE — DISCHARGE SUMMARY
[facility]   DISCHARGE SUMMARY      Name:  Fercho Medellin   Age:  59 y.o.  Sex:  male  :  1965  MRN:  [id number]   Visit Number:  [id number]    Admission Date:  2025  Date of Discharge:  2025  Primary Care Physician:  Provider, No Known    Important issues to note:    1.  Patient was admitted with left-sided facial droop with no other focal neurological symptoms.  He was evaluated by neurology as well as CT of the head and MRI of the head which was negative for acute stroke.  Neurology felt patient likely has Bell's palsy and recommended discharge on Medrol Dosepak.  MRI also showed an incidental finding of subacute left-sided subdural hematoma and neurology recommended repeat MRI in 6 months to document resolution.  They recommended to discontinue aspirin and continue warfarin at the current dose.  2.  Follow-up with [facility] for primary care in 1 week.    Discharge Diagnoses:     1.  Left-sided Bell's palsy, POA.  2.  No evidence of acute stroke.  3.  Incidental finding of left subacute subdural hematoma on MRI-aspirin discontinued and patient would need repeat MRI in 6 months.  4.  Status post mechanical mitral valve on warfarin.  5.  Essential hypertension.  6.  Diabetes mellitus type 2.  7.  Obstructive sleep apnea on home CPAP therapy.  8.  Chronic tobacco dependence.    Problem List:     Active Hospital Problems    Diagnosis  POA    **Ischemic stroke [I63.9]  Yes      Resolved Hospital Problems   No resolved problems to display.     Presenting Problem:    Chief Complaint   Patient presents with    Facial Droop      Consults:     Consulting Physician(s)         Provider   Role Specialty     Ximena Joe MD      Consulting Physician Neurology          Procedures Performed:    None.    History of presenting illness/Hospital Course:    Fercho Medellin is a 59 years old right-handed white male with known diagnosis of mechanical mitral valve on Coumadin  and aspirin, hypertension, mixed hyperlipidemia, type 2 diabetes, obesity, tobacco abuse presented to the hospital for evaluation of left facial droop symptoms started yesterday at 7 PM, denied other focal neurological deficit.  Patient stated that he had a prior history of Bell's palsy on the same side with symptoms comes and goes.    He endorses occipital headache on the left that has been gone since yesterday currently 5/10 intensity, he denied ear pain or rash, denied recent respiratory infection.  He smokes 2 pack/day for 30+ years, denied drinking or drug illicit.     In the emergency room, he was afebrile and hemodynamically stable saturating at 95% on room air.  Blood work including CBC and CMP was unremarkable except for glucose of 123 and white count of 12.  INR was therapeutic at 3.4.  Noncontrast CT of the head, CT angiogram of the head and neck, CT cerebral perfusion were unremarkable without any acute disease.  Patient condition was discussed with telemetry neurology and the patient was subsequently admitted to the medical floor with telemetry for further evaluation and management.      Patient was seen by telemetry neurology who did not feel patient had acute coronary syndrome but recommended MRI of the brain as well as 2D echocardiogram.  Patient was continued on his Coumadin.  MRI of the brain was negative for acute stroke but showed left posterior parietal extra-axial subdural hematoma suspect subacute.  2D echocardiogram showed left ventricular ejection fraction of 52% with grade 2 diastolic dysfunction and prosthetic mitral valve.    Patient was seen by Dr. Phipps from neurology today and he felt that the patient's symptoms are secondary to Bell's palsy and recommended Medrol Dosepak.  Due to his subdural hematoma which is subacute, he recommended discontinuation of aspirin and continuing Coumadin with INR goal of 2.5-3.5.  Recommended outpatient follow-up MRI in 6 months to document resolution  of his subdural hematoma.    Patient is currently doing better and is eager to go home.  He lives with his wife.  He currently does not have a primary care provider but used to see Dr. Reyes at [facility] and will be scheduled to see provider at [facility] in 1 week.  I have discussed the patient's condition and discharge plan with his wife Susana who is at the bedside.     Vital Signs:    Temp:  [97.7 °F (36.5 °C)-98.3 °F (36.8 °C)] 98.3 °F (36.8 °C)  Heart Rate:  [58-75] 58  Resp:  [16-22] 16  BP: (125-169)/(69-85) 134/69    Physical Exam:    General Appearance:  Alert and cooperative.    Head:  Atraumatic and normocephalic.   Eyes: Conjunctivae and sclerae normal, no icterus. No pallor.   Ears:  Ears with no abnormalities noted.   Throat: No oral lesions, no thrush, oral mucosa moist.   Neck: Supple, trachea midline, no thyromegaly.   Back:   No kyphoscoliosis present. No tenderness to palpation.   Lungs:   Breath sounds heard bilaterally equally.  No crackles or wheezing. No Pleural rub or bronchial breathing.   Heart:  Normal heart sounds with mitral valve click, no murmur, no gallop, no rub. No JVD.  Midsternal scar noted.   Abdomen:   Normal bowel sounds, no masses, no organomegaly. Soft, nontender, nondistended, no rebound tenderness.   Extremities: Supple, no edema, no cyanosis, no clubbing.   Pulses: Pulses palpable bilaterally.   Skin: No bleeding or rash.   Neurologic: Alert and oriented x 3. No facial asymmetry. Moves all four limbs. No tremors.     Pertinent Lab Results:     Results from last 7 days   Lab Units 04/12/25  1717   SODIUM mmol/L 138   POTASSIUM mmol/L 4.3   CHLORIDE mmol/L 100   CO2 mmol/L 27.1   BUN mg/dL 8   CREATININE mg/dL 1.05   CALCIUM mg/dL 9.4   BILIRUBIN mg/dL 0.3   ALK PHOS U/L 78   ALT (SGPT) U/L 16   AST (SGOT) U/L 16   GLUCOSE mg/dL 123*     Results from last 7 days   Lab Units 04/13/25  0608 04/12/25  1717   WBC 10*3/mm3 12.26* 12.28*   HEMOGLOBIN  g/dL 14.6 14.6   HEMATOCRIT % 44.3 43.7   PLATELETS 10*3/mm3 238 264     Results from last 7 days   Lab Units 04/13/25  0609 04/12/25  1717   INR  3.28* 3.40*     Pertinent Radiology Results:    Imaging Results (All)       Procedure Component Value Units Date/Time    MRI Brain With & Without Contrast [[id number]] Collected: 04/13/25 1018     Updated: 04/13/25 1042    Narrative:      PROCEDURE: MRI BRAIN W WO CONTRAST-     HISTORY: rule out stroke vs bells palsy; R29.810-Facial weakness, left  facial droop. Compare CT head 1 day prior as well as previous MRI brain  12/31/2014.     PROCEDURE: Multiplanar MR imaging of the brain was performed in multiple  MR sequences with and without contrast.     FINDINGS: Brain parenchyma displays normal signal without evidence of  mass, hemorrhage or edema. There are small areas of encephalomalacia in  the left thalamus and right basal ganglia, consistent with small remote  CVAs as seen on the previous head CT. Also, question remote CVAs in the  posterior parietal regions bilaterally. There is mild small vessel  ischemic disease. Best appreciated on the T2 FLAIR images is a small  extra-axial fluid collection left occipital posterior parietal region.  This is of increased T2 FLAIR signal, increased T2 and isointense on T1.  This does not demonstrate significant enhancement. Suspect very small  subdural fluid collection, subacute. Limited images the proximal cord  are unremarkable. The ventricles are mildly dilated as seen on recent  head CT. This also appears similar to the remote brain MRI. There is no  midline shift. Flow-voids are appropriate. Diffusion weighted images  demonstrate no evidence of acute CVA. Limited images of the paranasal  sinuses are unremarkable. Postcontrast images demonstrate no abnormal  enhancement.       Impression:      1. Minimal left posterior parietal extra-axial subdural hematoma,  suspect subacute. TEJ Spears on [employer] was notified at 10:10  a.m.  04/13/2025.     2. No evidence of acute CVA.     3. Atrophy and remote CVAs with small vessel ischemic disease as  described.           This report was signed and finalized on 4/13/2025 10:40 AM by Viridiana Smith MD.       XR Chest 1 View [[id number]] Collected: 04/13/25 0834     Updated: 04/13/25 0836    Narrative:      PROCEDURE: XR CHEST 1 VW-     HISTORY: Stroke Protocol (Onset > 12 hrs)     COMPARISON: January 9, 2024..     FINDINGS: The heart is normal in size. The lungs are clear. The  mediastinum is unremarkable. There is no pneumothorax.  There are no  acute osseous abnormalities. Status post median sternotomy. Apical  lordotic positioning noted. Valve replacement again noted.       Impression:      No acute cardiopulmonary process.     This report was signed and finalized on 4/13/2025 8:34 AM by Viridiana Smith MD.       CT CEREBRAL PERFUSION WITH & WITHOUT CONTRAST [[id number]] Collected: 04/12/25 1824     Updated: 04/12/25 1825    Narrative:      FINAL REPORT    TECHNIQUE:  null    CLINICAL HISTORY:  Neuro deficit, acute, stroke suspected    , facial droop    COMPARISON:  null    FINDINGS:  CT Brain Perfusion:    Comparison: Same day noncontrst CT    Findings:    CBF vol. < 30% is 0.0 ml. (Normal 28-65 ml/100gm/min. Front Neurol 2017; 8: 325).    Mismatch volume, Tmax >6sec - CBF < 30% = 0.0 ml.    Mismatch ratio: Tmax > 6 sec vol / CBF vol < 30% = 0.0. ML    Movement maps: The movement maps do not show a lot of motion. No misregistration.    AIF and VOF tracings: good curve, no jagged lines, good upstroke and decrease.      Impression:      Impression:    Normal brain perfusion.    Authenticated and Electronically Signed by Stephan Brewster MD on  04/12/2025 06:24:12 PM    CT Angiogram Neck [[id number]] Collected: 04/12/25 1805     Updated: 04/12/25 1806    Narrative:      FINAL REPORT    TECHNIQUE:  null    CLINICAL HISTORY:  Stroke, , facial droop    COMPARISON:  null    FINDINGS:  CTA Neck, Bolus  contrast injection, 3D reconstructions and MPRs:    Comparison: None    Findings:    Soft tissues of the neck are unremarkable.    Superior aorta and branch vessels are unremarkable.    Right carotid: No stenosis (NASCET).    Right vertebral: No stenosis.    Left Carotid: No stenosis (NASCET).    Left Vertebral: No stenosis.      Impression:      Impression:    No stenosis.    No aneurysm or dissection.    Authenticated and Electronically Signed by Stephan Brewster MD on  04/12/2025 06:05:00 PM    CT Angiogram Head w AI Analysis of LVO [[id number]] Collected: 04/12/25 1802     Updated: 04/12/25 1803    Narrative:      FINAL REPORT    TECHNIQUE:  null    CLINICAL HISTORY:  Neuro deficit, acute, stroke suspected    , facial droop    COMPARISON:  null    FINDINGS:  CTA HEAD, bolus contrast injection. 3D reconstructions and MPRs::    Comparison: None    Findings:    Right Carotid Siphon: Calcified plaque with no stenosis.    Right Anterior Cerebral Artery: A1 and A2 segments are unremarkable. There is peripheral cortical enhancement.    Right Middle Cerebral Artery: M1 and M2 segments are unremarkable. There is peripheral cortical enhancement.    Right Posterior Cerebral Artery: There is right posterior cerebral persistent fetal circulation, a normal variant. P2 segments are unremarkable. There is peripheral enhancement.    Left Carotid Siphon: Calcified plaque with mild stenosis.    Left Anterior Cerebral Artery: A1 and A2 segments are unremarkable. There is peripheral cortical enhancement.    Left Middle Cerebral Artery: M1 and M2 segments are unremarkable. There is peripheral cortical enhancement.    Left Posterior Cerebral Artery: P1 and P2 segments are unremarkable. There is peripheral cortical enhancement.    Basilar Artery: Normal.    Venous drainage: Unremarkable.      Impression:      Impression:    No signs of aneurysm.    No signs of arterial venous malformation.    No hemodynamically significant stenosis. No  large vessel occlusions.    Authenticated and Electronically Signed by Stephan Brewster MD on  04/12/2025 06:02:04 PM    CT Head Without Contrast Stroke Protocol [[id number]] Collected: 04/12/25 1753     Updated: 04/12/25 1800    Addenda:        ADDENDUM REPORT    ADDENDUM:  This report was discussed with Lisa Mooney RN on Apr 12, 2025 17:58:00   EDT.    Authenticated and Electronically Signed by Stephan Brewster MD on  04/12/2025 05:58:54 PM  Signed: 04/12/25 1758 by Stephan Brewster MD    Narrative:      FINAL REPORT    TECHNIQUE:  null    CLINICAL HISTORY:  Stroke, facial droop    COMPARISON:  null    FINDINGS:  CT Head Without Contrast:    Comparison: None    Findings:    Cortical sulci are prominent.    There are bilateral remote lacunar infarcts involving the basal ganglia.    No shift in midline structures.    No intraparenchymal bleeding or abnormal extra axial blood fluid collections.    Normal pituitary size.    Clear paranasal sinuses.    Unremarkable orbital structures.    No depressed fractures.      Impression:      Impression:    Unremarkable CT of the head.    ASPECTS score 10, NORMAL    Authenticated and Electronically Signed by Stephan Brewster MD on  04/12/2025 05:53:38 PM     Echo:    Results for orders placed during the hospital encounter of 04/12/25    Adult Transthoracic Echo Complete W/ Cont if Necessary Per Protocol    Interpretation Summary    Left ventricular systolic function is normal. Calculated left ventricular EF = 52.4%    Left ventricular diastolic function is consistent with (grade II w/high LAP) pseudonormalization.    The left atrial cavity is dilated.    Saline test results are negative for right to left atrial level shunt.    There is a prosthetic mitral valve present.  There is no MR or stenosis.  Mean gradient 3 mmHg.    Estimated right ventricular systolic pressure from tricuspid regurgitation is mildly elevated (35-45 mmHg).    Dilation of the aortic root is present.  4  cm    Condition on Discharge:      Stable.    Code status during the hospital stay:    Code Status and Medical Interventions: CPR (Attempt to Resuscitate); Full Support   Ordered at: 04/12/25 1925     Code Status (Patient has no pulse and is not breathing):    CPR (Attempt to Resuscitate)     Medical Interventions (Patient has pulse or is breathing):    Full Support     Level Of Support Discussed With:    Patient     Discharge Disposition:    Home or Self Care    Discharge Medications:       Discharge Medications        Continue These Medications        Instructions Start Date   albuterol sulfate  (90 Base) MCG/ACT inhaler  Commonly known as: PROVENTIL HFA;VENTOLIN HFA;PROAIR HFA   Two puffs per day every 4-6 hours as needed for wheeze or shortness of breath.      atorvastatin 40 MG tablet  Commonly known as: LIPITOR   40 mg, Oral, Daily      carvedilol 6.25 MG tablet  Commonly known as: COREG   6.25 mg, Oral, 2 Times Daily With Meals      glipizide 5 MG tablet  Commonly known as: GLUCOTROL   5 mg, Oral, 2 Times Daily Before Meals      Lasix 20 MG tablet  Generic drug: furosemide   20 mg, Oral, 2 Times Daily      lisinopril 20 MG tablet  Commonly known as: PRINIVIL,ZESTRIL   20 mg, Oral, Daily      Magnesium Oxide -Mg Supplement 400 (240 Mg) MG tablet   1 tablet, Oral, Daily      metFORMIN 1000 MG tablet  Commonly known as: GLUCOPHAGE   1,000 mg, 2 Times Daily With Meals      methylPREDNISolone 4 MG dose pack  Commonly known as: MEDROL   Take as directed on package instructions.      warfarin 5 MG tablet  Commonly known as: COUMADIN   2 tabs po daily             Stop These Medications      aspirin 81 MG EC tablet            Discharge Diet:     Diet Instructions       Diet: Diabetic Diets; Consistent Carbohydrate; Regular (IDDSI 7); Thin (IDDSI 0)      Discharge Diet: Diabetic Diets    Diabetic Diet: Consistent Carbohydrate    Texture: Regular (IDDSI 7)    Fluid Consistency: Thin (IDDSI 0)          Activity at  Discharge:     Activity Instructions       Activity as Tolerated            Follow-up Appointments:     Follow-up Information       Robert Miller MD Follow up.    Specialty: Internal Medicine  Why: Follow[facility] with [facility] in 1 week  Contact information:  [address] [place] [id number] [phone]                           Test Results Pending at Discharge:    Pending Results       None               Adalberto Ramires MD  04/13/25  15:00 EDT    Time: I spent 25 minutes on this discharge activity which included: face-to-face encounter with the patient, reviewing the data in the system, coordination of the care with the nursing staff as well as consultants, documentation, and entering orders.     Dictated utilizing Dragon dictation.

## 2025-04-13 NOTE — PROGRESS NOTES
"DOS: 2025  NAME: Fercho Medellin   : 1965  PCP: Provider, No Known  Chief Complaint   Patient presents with    Facial Droop       Chief complaint: He was seen yesterday by my colleague Dr. Len Gayle, please review his consultation notes for all the details.  Subjective: Patient has a history of Bell's palsy in 2019 and he had a similar 1 yesterday.  However he has been complaining of pain in the left occipital area for the last couple of days.  Yesterday he woke up with drooping of the left side of the face and when he was drinking water it was running out of the left side of the face.  He denies any headaches except for the left occipital headache.  He denies bumping into things but he says he bruises easily as he has been taking a baby aspirin along with his warfarin which is therapeutic as he has to keep it in the range between 2.5-3.5 INR as he has 2 artificial metallic valves.  I reviewed the MRI films in details with the patient and his wife and there is a small subdural hematoma in the left posterior parietal area right with the occipital area is and he was experiencing the headache.  This seems to be more like a referred pain.  However there is no nuchal rigidity.  Patient denies any earaches or any kind of ringing sensation in the ears or any discharge from the ears.    Objective:  Vital signs: /69 (BP Location: Left arm, Patient Position: Sitting)   Pulse 58   Temp 98.3 °F (36.8 °C) (Oral)   Resp 16   Ht 182.9 cm (72.01\")   Wt 123 kg (271 lb 2.7 oz)   SpO2 95%   BMI 36.77 kg/m²    Gen: NAD, vitals reviewed  MS: Normal.  CN: Cranials 2-12: Left 7th nerve: He is unable to raise the eyebrows equally like the right side and the left side of the buccinator muscles are weak and the left platysma is also weak.  When he smiles the angle of the face gets pulled more to the right.  No sensory loss noted and he has no other deficits.  Motor: Muscles of both upper and lower " extremities show good bulk power and tone.  Sensory: No sensory loss noted.  Coordination: Normal finger-to-nose coordination noted.  Gait: Able to get up and ambulate independently.  Romberg is negative.    ROS:  General: Pleasant gentleman currently feeling better compared to yesterday.  However the Bell's palsy is more pronounced today.  Neurological: The left posterior parietal small subdural hematoma may be contributing to the left occipital headache which is a referred pain.  This is an incidental finding which might be a spontaneous hemorrhage from minimal trauma as he has been on both a therapeutic dose of warfarin for artificial valve along with a baby aspirin.    Laboratory results:  Lab Results   Component Value Date    GLUCOSE 123 (H) 04/12/2025    CALCIUM 9.4 04/12/2025     04/12/2025    K 4.3 04/12/2025    CO2 27.1 04/12/2025     04/12/2025    BUN 8 04/12/2025    CREATININE 1.05 04/12/2025    EGFRIFAFRI 108 03/15/2017    EGFRIFNONA 110 10/16/2021    BCR 7.6 04/12/2025    ANIONGAP 10.9 04/12/2025     Lab Results   Component Value Date    WBC 12.26 (H) 04/13/2025    HGB 14.6 04/13/2025    HCT 44.3 04/13/2025    MCV 88.4 04/13/2025     04/13/2025     Lab Results   Component Value Date    LDL 56 04/13/2025     (H) 03/15/2017    LDL 98 12/19/2014         Lab 04/13/25  0608   HEMOGLOBIN A1C 7.30*        Review of labs: A1c is 7.3.  The white cell count is elevated at 12,260.  The LDL is 56.     CMP:        Lab 04/12/25  1717   SODIUM 138   POTASSIUM 4.3   CHLORIDE 100   CO2 27.1   ANION GAP 10.9   BUN 8   CREATININE 1.05   EGFR 82.3   GLUCOSE 123*   CALCIUM 9.4   TOTAL PROTEIN 7.9   ALBUMIN 4.4   GLOBULIN 3.5   ALT (SGPT) 16   AST (SGOT) 16   BILIRUBIN 0.3   ALK PHOS 78             Lipid Panel          4/13/2025    06:09   Lipid Panel   Total Cholesterol 114    Triglycerides 154    HDL Cholesterol 31    VLDL Cholesterol 27    LDL Cholesterol  56    LDL/HDL Ratio 1.68       Lab  Results   Component Value Date    HGBA1C 7.30 (H) 04/13/2025           Review and interpretation of imaging: CTA HEAD, bolus contrast injection. 3D reconstructions and MPRs::     Comparison: None     Findings:     Right Carotid Siphon: Calcified plaque with no stenosis.     Right Anterior Cerebral Artery: A1 and A2 segments are unremarkable. There is peripheral cortical enhancement.     Right Middle Cerebral Artery: M1 and M2 segments are unremarkable. There is peripheral cortical enhancement.     Right Posterior Cerebral Artery: There is right posterior cerebral persistent fetal circulation, a normal variant. P2 segments are unremarkable. There is peripheral enhancement.     Left Carotid Siphon: Calcified plaque with mild stenosis.     Left Anterior Cerebral Artery: A1 and A2 segments are unremarkable. There is peripheral cortical enhancement.     Left Middle Cerebral Artery: M1 and M2 segments are unremarkable. There is peripheral cortical enhancement.     Left Posterior Cerebral Artery: P1 and P2 segments are unremarkable. There is peripheral cortical enhancement.     Basilar Artery: Normal.     Venous drainage: Unremarkable.     IMPRESSION:  Impression:     No signs of aneurysm.     No signs of arterial venous malformation.     No hemodynamically significant stenosis. No large vessel occlusions.    CT Head Without Contrast Stroke Protocol  Addendum Date: 4/12/2025  ADDENDUM REPORT ADDENDUM: This report was discussed with Lisa Mooney RN on Apr 12, 2025 17:58:00 EDT. Authenticated and Electronically Signed by Stephan Brewster MD on 04/12/2025 05:58:54 PM    Result Date: 4/12/2025  FINAL REPORT TECHNIQUE: null CLINICAL HISTORY: Stroke, facial droop COMPARISON: null FINDINGS: CT Head Without Contrast: Comparison: None Findings: Cortical sulci are prominent. There are bilateral remote lacunar infarcts involving the basal ganglia. No shift in midline structures. No intraparenchymal bleeding or abnormal extra axial  blood fluid collections. Normal pituitary size. Clear paranasal sinuses. Unremarkable orbital structures. No depressed fractures.     Impression: Impression: Unremarkable CT of the head. ASPECTS score 10, NORMAL Authenticated and Electronically Signed by Stephan Brewstre MD on 04/12/2025 05:53:38 PM         MRI Brain With & Without Contrast  Result Date: 4/13/2025  PROCEDURE: MRI BRAIN W WO CONTRAST-  HISTORY: rule out stroke vs bells palsy; R29.810-Facial weakness, left facial droop. Compare CT head 1 day prior as well as previous MRI brain 12/31/2014.  PROCEDURE: Multiplanar MR imaging of the brain was performed in multiple MR sequences with and without contrast.  FINDINGS: Brain parenchyma displays normal signal without evidence of mass, hemorrhage or edema. There are small areas of encephalomalacia in the left thalamus and right basal ganglia, consistent with small remote CVAs as seen on the previous head CT. Also, question remote CVAs in the posterior parietal regions bilaterally. There is mild small vessel ischemic disease. Best appreciated on the T2 FLAIR images is a small extra-axial fluid collection left occipital posterior parietal region. This is of increased T2 FLAIR signal, increased T2 and isointense on T1. This does not demonstrate significant enhancement. Suspect very small subdural fluid collection, subacute. Limited images the proximal cord are unremarkable. The ventricles are mildly dilated as seen on recent head CT. This also appears similar to the remote brain MRI. There is no midline shift. Flow-voids are appropriate. Diffusion weighted images demonstrate no evidence of acute CVA. Limited images of the paranasal sinuses are unremarkable. Postcontrast images demonstrate no abnormal enhancement.      Impression: 1. Minimal left posterior parietal extra-axial subdural hematoma, suspect subacute. TEJ Spears on [employer] was notified at 10:10 a.m. 04/13/2025.  2. No evidence of acute CVA.  3. Atrophy and  remote CVAs with small vessel ischemic disease as described.    This report was signed and finalized on 4/13/2025 10:40 AM by Viridiana Smith MD.         Workup to date:CTA Neck, Bolus contrast injection, 3D reconstructions and MPRs:     Comparison: None     Findings:     Soft tissues of the neck are unremarkable.     Superior aorta and branch vessels are unremarkable.     Right carotid: No stenosis (NASCET).     Right vertebral: No stenosis.     Left Carotid: No stenosis (NASCET).     Left Vertebral: No stenosis.     IMPRESSION:  Impression:     No stenosis.     No aneurysm or dissection.    Results for orders placed during the hospital encounter of 04/12/25    Adult Transthoracic Echo Complete W/ Cont if Necessary Per Protocol    Interpretation Summary    Left ventricular systolic function is normal. Calculated left ventricular EF = 52.4%    Left ventricular diastolic function is consistent with (grade II w/high LAP) pseudonormalization.    The left atrial cavity is dilated.    Saline test results are negative for right to left atrial level shunt.    There is a prosthetic mitral valve present.  There is no MR or stenosis.  Mean gradient 3 mmHg.    Estimated right ventricular systolic pressure from tricuspid regurgitation is mildly elevated (35-45 mmHg).    Dilation of the aortic root is present.  4 cm              Diagnoses: Acute left-sided Bell's palsy.  Incidental finding of left posterior parietal subdural hematoma which is resulting in a referred pain in the left occipital area.  Patient possibly has inadvertently bumped his head without realizing and he is currently on a therapeutic dose of warfarin for his artificial metallic heart valve along with baby aspirin which tends to put him at an increased risk of inadvertent hemorrhage.      Comment: CT Brain Perfusion:     Comparison: Same day noncontrst CT     Findings:     CBF vol. < 30% is 0.0 ml. (Normal 28-65 ml/100gm/min. Front Neurol 2017; 8: 325).      Mismatch volume, Tmax >6sec - CBF < 30% = 0.0 ml.     Mismatch ratio: Tmax > 6 sec vol / CBF vol < 30% = 0.0. ML     Movement maps: The movement maps do not show a lot of motion. No misregistration.     AIF and VOF tracings: good curve, no jagged lines, good upstroke and decrease.     IMPRESSION:  Impression:     Normal brain perfusion.    Plan:  1.  Discussed with the patient and his wife  2.  To repeat an MRI of the brain without contrast in 6 months time to make sure there is complete resolution of the left-sided subdural hematoma.  3.  Medrol Dosepak to help the resolution of the left-sided facial droop.  4.  Patient physical therapy is also helpful.    Discussed with the patient and Dr. Adalberto Ramires and if medically stable he can be discharged home.    Spent a total of 30 minutes in face-to-face evaluation and management of the patient using the dedicated telemedicine device without any interruption with the help of the rounding nurse with the patient located at the [facility] and myself at a remote location.    Electronically signed by Alberto Phipps MD, 04/13/25, 2:28 PM EDT.

## 2025-04-13 NOTE — PAYOR COMM NOTE
"To:  [employer]  From: Lola Pierce RN  Phone: [phone]  Fax: [phone]  NPI: [id number]  TIN: [id number]  Member ID: [id number]   MRN: [id number]    OBSERVATION NOTIFICATION    Fercho Medellin (59 y.o. Male)       Date of Birth   1965    Social Security Number       Address   [address] [facility] [address]    Home Phone   [phone]    MRN   [id number]       Yazdanism   None    Marital Status                               Admission Date   2025    Admission Type   Emergency    Admitting Provider   Adalberto Ramires MD    Attending Provider   Adalberto Ramires MD    Department, Room/Bed   [facility] TELEMETRY 3, 318/1       Discharge Date       Discharge Disposition       Discharge Destination                                 Attending Provider: Adalberto Ramires MD    Allergies: Eye Drops [Naphazoline-polyethyl Glycol]    Isolation: None   Infection: None   Code Status: CPR    Ht: 182.9 cm (72.01\")   Wt: 123 kg (271 lb 2.7 oz)    Admission Cmt: None   Principal Problem: Ischemic stroke [I63.9]                   Active Insurance as of 2025       Primary Coverage       Payor Plan Insurance Group Employer/Plan Group    [place] [id number]       Payor Plan Address Payor Plan Phone Number Payor Plan Fax Number Effective Dates    [employer] [address] [address] [phone]  2020 - None Entered    [place] [address]         Subscriber Name Subscriber Birth Date Member ID       FERCHO MEDELLIN 1965 [id number]                     Emergency Contacts        (Rel.) Home Phone Work Phone Mobile Phone    Rubi Medellin (Spouse) [phone] -- [phone]                 History & Physical        Khari Denise MD at 25 [phone]            [facility] HOSPITALIST   HISTORY AND PHYSICAL      Name:  Fercho Medellin   Age:  59 y.o.  Sex:  male  :  1965  MRN:  [id number]   Visit Number:  [id number]  Admission Date:  " 4/12/2025  Date Of Service:  04/12/25  Primary Care Physician:  Provider, No Known    Chief Complaint:     Facial numbness, mild headache     History Of Presenting Illness:      59 years old right-handed white male with known diagnosis of mechanical valve on Coumadin and aspirin, hypertension, mixed hyperlipidemia, type 2 diabetes, obesity, tobacco abuse presented to the hospital for evaluation of left facial droop symptoms started yesterday at 7 PM, denied other focal neurological deficit.  Patient stated that he had a prior history of Bell's palsy on the same side with symptoms comes and goes.        He endorses occipital headache on the left that has been gone since yesterday currently 5/10 intensity, he denied ear pain or rash, denied recent respiratory infection.  He smokes 2 pack/day for 30+ years, denied drinking or drug illicit.     Review Of Systems:    All systems were reviewed and negative except as mentioned in history of presenting illness, assessment and plan.    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    Past Surgical History: Patient  has a past surgical history that includes Back surgery; Appendectomy; Eye surgery; Interventional radiology procedure (Bilateral, 8/22/2016); and Cardiac valve replacement.    Social History: Patient  reports that he has been smoking cigarettes. He started smoking about 44 years ago. He has a 88.6 pack-year smoking history. He has never used smokeless tobacco. He reports that he does not currently use alcohol. He reports current drug use. Drug: Marijuana.    Family History:  Patient's family history has been reviewed and found to be noncontributory.      Allergies:      Eye drops [naphazoline-polyethyl glycol]    Home Medications:    Prior to Admission Medications       Prescriptions Last Dose Informant Patient Reported? Taking?    albuterol (PROVENTIL HFA;VENTOLIN HFA) 108 (90 Base) MCG/ACT inhaler   No No    Two puffs per day every 4-6 hours as needed for wheeze or shortness of breath.    aspirin 81 MG EC tablet   Yes No    Take 81 mg by mouth Daily.    atorvastatin (LIPITOR) 40 MG tablet   Yes No    Take 1 tablet by mouth Daily.    carvedilol (COREG) 6.25 MG tablet   Yes No    Take 1 tablet by mouth 2 (Two) Times a Day With Meals.    furosemide (Lasix) 20 MG tablet   Yes No    Take 1 tablet by mouth 2 (Two) Times a Day.    glipizide (GLUCOTROL) 5 MG tablet   Yes No    Take 1 tablet by mouth 2 (Two) Times a Day Before Meals.    lisinopril (PRINIVIL,ZESTRIL) 20 MG tablet   Yes No    Take 1 tablet by mouth Daily.    Magnesium Oxide 400 (240 Mg) MG tablet   Yes No    Take 1 tablet by mouth Daily.    metFORMIN (GLUCOPHAGE) 1000 MG tablet   Yes No    Take 1,000 mg by mouth 2 (Two) Times a Day With Meals.    MethylPREDNISolone (MEDROL, JUAN ANTONIO,) 4 MG tablet   No No    Take as directed on package instructions.    warfarin (COUMADIN) 5 MG tablet   No No    2 tabs po daily          ED Medications:    Medications   sodium chloride 0.9 % flush 10 mL (has no administration in time range)   atorvastatin (LIPITOR) tablet 80 mg (has no administration in time range)   buprenorphine-naloxone (SUBOXONE) 8-2 MG film 1 film (has no administration in time range)   pantoprazole (PROTONIX) injection 40 mg (has no administration in time range)   iopamidol (ISOVUE-300) 61 % injection 85 mL (50 mL Intravenous Given 4/12/25 1733)   iopamidol (ISOVUE-300) 61 % injection 100 mL (100 mL Intravenous Given 4/12/25 1746)     Vital Signs:  Temp:  [97.7 °F (36.5 °C)] 97.7 °F (36.5 °C)  Heart Rate:  [70] 70  Resp:  [18] 18  BP: (159)/(81) 159/81        04/12/25  1701   Weight: 125 kg (275 lb)  "    Body mass index is 37.3 kg/m².    Physical Exam:     Most recent vital Signs: /81 (BP Location: Left arm, Patient Position: Sitting)   Pulse 70   Temp 97.7 °F (36.5 °C) (Oral)   Resp 18   Ht 182.9 cm (72\")   Wt 125 kg (275 lb)   SpO2 95%   BMI 37.30 kg/m²     Physical Exam    General: NAD, resting comfortably in bed.   HEENT: Normocephalic, atraumatic, PERRL. neck supple, No JVD.  No vesicles or lesions inside left ear  Cardiovascular: Regularly irregular rhythm. No murmurs, rubs, or gallops. Peripheral pulses 2+ bilaterally. No pedal edema,   Pulmonary: Lungs CTAB, No wheezes, rales, or rhonchi. No accessory muscle use, no resp. distress.   Abdomen: Soft, non-tender, non-distended. No guarding or rebound.   Extremities: No cyanosis, clubbing or edema. Warm and well-perfused. Capillary refill < 2 s   Skin: No rashes, lesions, or ulcers.   Neurological: Alert and oriented to person, place, and time. No focal deficits. Cranial nerves II-XII grossly intact. Motor strength 5/5 in all extremities. Sensation grossly intact to light touch.   Psychiatric: Calm, cooperative. Mood and affect appropriate for the situation.        Laboratory data:    I have reviewed the labs done in the emergency room.    Results from last 7 days   Lab Units 04/12/25  1717   SODIUM mmol/L 138   POTASSIUM mmol/L 4.3   CHLORIDE mmol/L 100   CO2 mmol/L 27.1   BUN mg/dL 8   CREATININE mg/dL 1.05   CALCIUM mg/dL 9.4   BILIRUBIN mg/dL 0.3   ALK PHOS U/L 78   ALT (SGPT) U/L 16   AST (SGOT) U/L 16   GLUCOSE mg/dL 123*     Results from last 7 days   Lab Units 04/12/25  1717   WBC 10*3/mm3 12.28*   HEMOGLOBIN g/dL 14.6   HEMATOCRIT % 43.7   PLATELETS 10*3/mm3 264     Results from last 7 days   Lab Units 04/12/25  1717   INR  3.40*                               Invalid input(s): \"USDES\", \"NITRITITE\", \"BACT\", \"EP\"    Pain Management Panel           No data to display                EKG:      Sinus rhythm with PACs    Radiology:    CT " CEREBRAL PERFUSION WITH & WITHOUT CONTRAST  Result Date: 4/12/2025  FINAL REPORT TECHNIQUE: null CLINICAL HISTORY: Neuro deficit, acute, stroke suspected , facial droop COMPARISON: null FINDINGS: CT Brain Perfusion: Comparison: Same day noncontrst CT Findings: CBF vol. < 30% is 0.0 ml. (Normal 28-65 ml/100gm/min. Front Neurol 2017; 8: 325). Mismatch volume, Tmax >6sec - CBF < 30% = 0.0 ml. Mismatch ratio: Tmax > 6 sec vol / CBF vol < 30% = 0.0. ML Movement maps: The movement maps do not show a lot of motion. No misregistration. AIF and VOF tracings: good curve, no jagged lines, good upstroke and decrease.     Impression: Normal brain perfusion. Authenticated and Electronically Signed by Stephan Brewster MD on 04/12/2025 06:24:12 PM    CT Angiogram Neck  Result Date: 4/12/2025  FINAL REPORT TECHNIQUE: null CLINICAL HISTORY: Stroke, , facial droop COMPARISON: null FINDINGS: CTA Neck, Bolus contrast injection, 3D reconstructions and MPRs: Comparison: None Findings: Soft tissues of the neck are unremarkable. Superior aorta and branch vessels are unremarkable. Right carotid: No stenosis (NASCET). Right vertebral: No stenosis. Left Carotid: No stenosis (NASCET). Left Vertebral: No stenosis.     Impression: No stenosis. No aneurysm or dissection. Authenticated and Electronically Signed by Stephan Brewster MD on 04/12/2025 06:05:00 PM    CT Angiogram Head w AI Analysis of LVO  Result Date: 4/12/2025  FINAL REPORT TECHNIQUE: null CLINICAL HISTORY: Neuro deficit, acute, stroke suspected , facial droop COMPARISON: null FINDINGS: CTA HEAD, bolus contrast injection. 3D reconstructions and MPRs:: Comparison: None Findings: Right Carotid Siphon: Calcified plaque with no stenosis. Right Anterior Cerebral Artery: A1 and A2 segments are unremarkable. There is peripheral cortical enhancement. Right Middle Cerebral Artery: M1 and M2 segments are unremarkable. There is peripheral cortical enhancement. Right Posterior Cerebral Artery: There  is right posterior cerebral persistent fetal circulation, a normal variant. P2 segments are unremarkable. There is peripheral enhancement. Left Carotid Siphon: Calcified plaque with mild stenosis. Left Anterior Cerebral Artery: A1 and A2 segments are unremarkable. There is peripheral cortical enhancement. Left Middle Cerebral Artery: M1 and M2 segments are unremarkable. There is peripheral cortical enhancement. Left Posterior Cerebral Artery: P1 and P2 segments are unremarkable. There is peripheral cortical enhancement. Basilar Artery: Normal. Venous drainage: Unremarkable.     Impression: No signs of aneurysm. No signs of arterial venous malformation. No hemodynamically significant stenosis. No large vessel occlusions. Authenticated and Electronically Signed by Stephan Brewster MD on 04/12/2025 06:02:04 PM    CT Head Without Contrast Stroke Protocol  Addendum Date: 4/12/2025  ADDENDUM REPORT ADDENDUM: This report was discussed with Lisa Mooney RN on Apr 12, 2025 17:58:00 EDT. Authenticated and Electronically Signed by Stephan Brewster MD on 04/12/2025 05:58:54 PM    Result Date: 4/12/2025  FINAL REPORT TECHNIQUE: null CLINICAL HISTORY: Stroke, facial droop COMPARISON: null FINDINGS: CT Head Without Contrast: Comparison: None Findings: Cortical sulci are prominent. There are bilateral remote lacunar infarcts involving the basal ganglia. No shift in midline structures. No intraparenchymal bleeding or abnormal extra axial blood fluid collections. Normal pituitary size. Clear paranasal sinuses. Unremarkable orbital structures. No depressed fractures.     Impression: Unremarkable CT of the head. ASPECTS score 10, NORMAL Authenticated and Electronically Signed by Stephan Brewster MD on 04/12/2025 05:53:38 PM      Assessment:    Stroke vs TIA vs  recurrent Bell's palsy  2. History of mechanical valves on Coumadin  -s/s left-sided facial numbness and mild occipital headache, history of stroke, last well-known approximately 12  hours ago  -Acute imaging CT scans grossly negative for any acute process.  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 pending bedside swallow             Khari Denise MD  04/12/25  19:32 EDT    Dictated utilizing Dragon dictation.      Electronically signed by Khari Denise MD at 04/12/25 3562

## 2025-04-13 NOTE — PLAN OF CARE
"Goal Outcome Evaluation:  Plan of Care Reviewed With: patient           Outcome Evaluation: PT evaluation completed this date with pt presenting supine in bed, O x 4, on room air, and having a stiffness in his lower back rated 2/10. Pt was noted to have L sided facial droop and a delay when closing L eyelid when compared to the R. Pt has a \"tingling\" feeling in L cheek but sensation to light touch intact. Pt was independent with bed mobility, transfers, and gait for 200 ft. Pt does have a toe out static standing position and the is also observed during gait. Pt's BLE strength grossly 4+/5 to 5/5 and pt was able to side step and backward step independently without any difficulty with balance. Pt does have R shoulder pain, which is chronic for him, with shoulder int rotation and with shoulder abduction. Pt had no change in his back pain with PT evaluation. No deficits were identified that would required skilled intervention in the inpt setting. Discussed this with pt and pt verbalized agreement. Will d/c pt at this time.    Anticipated Discharge Disposition (PT): home                        "

## 2025-04-17 ENCOUNTER — READMISSION MANAGEMENT (OUTPATIENT)
Dept: CALL CENTER | Facility: HOSPITAL | Age: 60
End: 2025-04-17
Payer: MEDICAID

## 2025-04-17 NOTE — OUTREACH NOTE
Stroke Week 1 Survey      Flowsheet Row Responses   [facility] facility patient discharged from? [facility]   Does the patient have one of the following disease processes/diagnoses(primary or secondary)? Stroke   Week 1 attempt successful? No   Unsuccessful attempts Attempt 1            ASHKAN LOPEZ - Registered Nurse

## 2025-04-22 ENCOUNTER — READMISSION MANAGEMENT (OUTPATIENT)
Dept: CALL CENTER | Facility: HOSPITAL | Age: 60
End: 2025-04-22
Payer: MEDICAID

## 2025-04-22 NOTE — OUTREACH NOTE
Stroke Week 1 Survey      Flowsheet Row Responses   [facility] patient discharged from? [facility]   Does the patient have one of the following disease processes/diagnoses(primary or secondary)? Stroke   Week 1 attempt successful? No   Unsuccessful attempts Attempt 2            ASHKAN LOPEZ - Registered Nurse

## 2025-05-02 ENCOUNTER — READMISSION MANAGEMENT (OUTPATIENT)
Dept: CALL CENTER | Facility: HOSPITAL | Age: 60
End: 2025-05-02
Payer: MEDICAID

## 2025-05-02 NOTE — OUTREACH NOTE
Stroke Week 3 Survey      Flowsheet Row Responses   [facility] patient discharged from? [facility]   Does the patient have one of the following disease processes/diagnoses(primary or secondary)? Stroke   Week 3 attempt successful? Yes   Call start time 1214   Call end time 1220   Discharge diagnosis Ischemic stroke   Is the patient taking all medications as directed (includes completed medication regime)? Yes   Does the patient have a primary care provider?  Yes   Does the patient have an appointment with their PCP within 7 days of discharge? Greater than 7 days   Has the patient kept scheduled appointments due by today? N/A   Comments Has appt with PCP on 5/7   Psychosocial issues? No   Does the patient require any assistance with activities of daily living such as eating, bathing, dressing, walking, etc.? No   Does the patient have any residual symptoms from stroke/TIA? Yes   Residual symptoms comments Left side weakness   What is the patient's perception of their health status since discharge? Improving   Is the patient/caregiver able to teach back the risk factors for a stroke? High blood pressure-goal below 120/80   Is the patient/caregiver able to teach back signs and symptoms related to disease process for when to call PCP? Yes   Is the patient/caregiver able to teach back signs and symptoms related to disease process for when to call 911? Yes   Is the patient/caregiver able to teach back the hierarchy of who to call/visit for symptoms/problems? PCP, Specialist, Home health nurse, Urgent Care, ED, 911 Yes   Additional teach back comments Cardiology is monitoring INR.  Pt has appt with new PCP on 5/7.  States still have left side facial weakness and it is bothering him.            Blanca DOHERTY - Licensed Nurse